# Patient Record
Sex: FEMALE | Race: BLACK OR AFRICAN AMERICAN | NOT HISPANIC OR LATINO
[De-identification: names, ages, dates, MRNs, and addresses within clinical notes are randomized per-mention and may not be internally consistent; named-entity substitution may affect disease eponyms.]

---

## 2018-09-26 ENCOUNTER — LAB SERVICES (OUTPATIENT)
Dept: OTHER | Age: 28
End: 2018-09-26

## 2018-09-26 ENCOUNTER — DIAGNOSTIC TRANS (OUTPATIENT)
Dept: OTHER | Age: 28
End: 2018-09-26

## 2018-09-26 ENCOUNTER — HOSPITAL (OUTPATIENT)
Dept: OTHER | Age: 28
End: 2018-09-26
Attending: EMERGENCY MEDICINE

## 2018-09-26 LAB
AMORPH SED URNS QL MICRO: ABNORMAL
AMORPH SED URNS QL MICRO: ABNORMAL
APPEARANCE UR: CLEAR
APPEARANCE UR: CLEAR
BACTERIA #/AREA URNS HPF: ABNORMAL /HPF
BACTERIA #/AREA URNS HPF: ABNORMAL /HPF
BILIRUB UR QL: NEGATIVE
BILIRUB UR QL: NEGATIVE
CAOX CRY URNS QL MICRO: ABNORMAL
CAOX CRY URNS QL MICRO: ABNORMAL
COLOR UR: ABNORMAL
COLOR UR: ABNORMAL
EPITH CASTS #/AREA URNS LPF: ABNORMAL
EPITH CASTS #/AREA URNS LPF: ABNORMAL /[LPF]
FATTY CASTS #/AREA URNS LPF: ABNORMAL
FATTY CASTS #/AREA URNS LPF: ABNORMAL /[LPF]
GLUCOSE UR-MCNC: NEGATIVE MG/DL
GLUCOSE UR-MCNC: NEGATIVE MG/DL
GRAN CASTS #/AREA URNS LPF: ABNORMAL
GRAN CASTS #/AREA URNS LPF: ABNORMAL /[LPF]
HCG POINT OF CARE (5HGRST): NEGATIVE
HCG POINT OF CARE (5HGRST): NEGATIVE
HGB UR QL: NEGATIVE
HYALINE CASTS #/AREA URNS LPF: ABNORMAL /LPF (ref 0–5)
HYALINE CASTS #/AREA URNS LPF: ABNORMAL /LPF (ref 0–5)
KETONES UR-MCNC: NEGATIVE MG/DL
KETONES UR-MCNC: NEGATIVE MG/DL
LEUKOCYTE ESTERASE UR QL STRIP: NEGATIVE
MIXED CELL CASTS #/AREA URNS LPF: ABNORMAL
MIXED CELL CASTS #/AREA URNS LPF: ABNORMAL /[LPF]
MUCOUS THREADS URNS QL MICRO: PRESENT
MUCOUS THREADS URNS QL MICRO: PRESENT
NITRITE UR QL: NEGATIVE
NITRITE UR QL: NEGATIVE
PH UR: 6 UNIT (ref 5–7)
PH UR: 6 UNITS (ref 5–7)
PROT UR QL: NEGATIVE MG/DL
PROT UR QL: NEGATIVE MG/DL
RBC #/AREA URNS HPF: ABNORMAL /HPF (ref 0–3)
RBC #/AREA URNS HPF: ABNORMAL /HPF (ref 0–3)
RBC CASTS #/AREA URNS LPF: ABNORMAL
RBC CASTS #/AREA URNS LPF: ABNORMAL /[LPF]
RBC-URINE: NEGATIVE
RENAL EPI CELLS #/AREA URNS HPF: ABNORMAL
RENAL EPI CELLS #/AREA URNS HPF: ABNORMAL /[HPF]
SP GR UR: 1.01 (ref 1–1.03)
SP GR UR: 1.01 (ref 1–1.03)
SPECIMEN SOURCE: ABNORMAL
SPECIMEN SOURCE: ABNORMAL
SPERM URNS QL MICRO: ABNORMAL
SPERM URNS QL MICRO: ABNORMAL
SQUAMOUS #/AREA URNS HPF: ABNORMAL /HPF (ref 0–5)
SQUAMOUS #/AREA URNS HPF: ABNORMAL /HPF (ref 0–5)
T VAGINALIS URNS QL MICRO: ABNORMAL
T VAGINALIS URNS QL MICRO: ABNORMAL
TRI-PHOS CRY URNS QL MICRO: ABNORMAL
TRI-PHOS CRY URNS QL MICRO: ABNORMAL
URATE CRY URNS QL MICRO: ABNORMAL
URATE CRY URNS QL MICRO: ABNORMAL
UROBILINOGEN UR QL: 0.2 MG/DL (ref 0–1)
UROBILINOGEN UR QL: 0.2 MG/DL (ref 0–1)
WAXY CASTS #/AREA URNS LPF: ABNORMAL
WAXY CASTS #/AREA URNS LPF: ABNORMAL /[LPF]
WBC #/AREA URNS HPF: ABNORMAL /HPF (ref 0–5)
WBC #/AREA URNS HPF: ABNORMAL /HPF (ref 0–5)
WBC CASTS #/AREA URNS LPF: ABNORMAL
WBC CASTS #/AREA URNS LPF: ABNORMAL /[LPF]
WBC-URINE: NEGATIVE
YEAST HYPHAE URNS QL MICRO: ABNORMAL
YEAST HYPHAE URNS QL MICRO: ABNORMAL
YEAST URNS QL MICRO: ABNORMAL
YEAST URNS QL MICRO: ABNORMAL

## 2018-11-20 ENCOUNTER — HOSPITAL (OUTPATIENT)
Dept: OTHER | Age: 28
End: 2018-11-20
Attending: EMERGENCY MEDICINE

## 2020-10-30 ENCOUNTER — TELEPHONE (OUTPATIENT)
Dept: OBGYN UNIT | Facility: HOSPITAL | Age: 30
End: 2020-10-30

## 2020-11-17 ENCOUNTER — OFFICE VISIT (OUTPATIENT)
Dept: FAMILY MEDICINE CLINIC | Facility: CLINIC | Age: 30
End: 2020-11-17
Payer: COMMERCIAL

## 2020-11-17 VITALS
WEIGHT: 181 LBS | TEMPERATURE: 97 F | DIASTOLIC BLOOD PRESSURE: 81 MMHG | HEART RATE: 99 BPM | SYSTOLIC BLOOD PRESSURE: 120 MMHG | RESPIRATION RATE: 17 BRPM

## 2020-11-17 DIAGNOSIS — J45.40 MODERATE PERSISTENT ASTHMA WITHOUT COMPLICATION: Primary | ICD-10-CM

## 2020-11-17 DIAGNOSIS — E66.9 CLASS 1 OBESITY WITH BODY MASS INDEX (BMI) OF 32.0 TO 32.9 IN ADULT, UNSPECIFIED OBESITY TYPE, UNSPECIFIED WHETHER SERIOUS COMORBIDITY PRESENT: ICD-10-CM

## 2020-11-17 DIAGNOSIS — Z76.89 ESTABLISHING CARE WITH NEW DOCTOR, ENCOUNTER FOR: ICD-10-CM

## 2020-11-17 PROCEDURE — 99072 ADDL SUPL MATRL&STAF TM PHE: CPT | Performed by: FAMILY MEDICINE

## 2020-11-17 PROCEDURE — 3079F DIAST BP 80-89 MM HG: CPT | Performed by: FAMILY MEDICINE

## 2020-11-17 PROCEDURE — 99213 OFFICE O/P EST LOW 20 MIN: CPT | Performed by: FAMILY MEDICINE

## 2020-11-17 PROCEDURE — 3074F SYST BP LT 130 MM HG: CPT | Performed by: FAMILY MEDICINE

## 2020-11-17 RX ORDER — ALBUTEROL SULFATE 2.5 MG/3ML
2.5 SOLUTION RESPIRATORY (INHALATION) EVERY 6 HOURS PRN
COMMUNITY

## 2020-11-17 RX ORDER — PROPRANOLOL HYDROCHLORIDE 10 MG/1
10 TABLET ORAL DAILY
COMMUNITY

## 2020-11-17 RX ORDER — BUDESONIDE AND FORMOTEROL FUMARATE DIHYDRATE 160; 4.5 UG/1; UG/1
AEROSOL RESPIRATORY (INHALATION)
COMMUNITY
Start: 2020-07-20 | End: 2020-11-17

## 2020-11-17 RX ORDER — ALPRAZOLAM 0.25 MG/1
0.25 TABLET ORAL NIGHTLY PRN
COMMUNITY
Start: 2019-03-25

## 2020-11-17 RX ORDER — FLUTICASONE PROPIONATE 50 MCG
SPRAY, SUSPENSION (ML) NASAL
COMMUNITY
Start: 2020-11-01

## 2020-11-17 RX ORDER — ALBUTEROL SULFATE 90 UG/1
AEROSOL, METERED RESPIRATORY (INHALATION)
Qty: 2 INHALER | Refills: 2 | Status: SHIPPED | OUTPATIENT
Start: 2020-11-17 | End: 2020-11-18

## 2020-11-17 RX ORDER — BUDESONIDE AND FORMOTEROL FUMARATE DIHYDRATE 160; 4.5 UG/1; UG/1
2 AEROSOL RESPIRATORY (INHALATION) 2 TIMES DAILY
Qty: 1 INHALER | Refills: 5 | Status: SHIPPED | OUTPATIENT
Start: 2020-11-17

## 2020-11-17 RX ORDER — ALBUTEROL SULFATE 90 UG/1
2 AEROSOL, METERED RESPIRATORY (INHALATION)
COMMUNITY
Start: 2019-02-18 | End: 2020-11-17

## 2020-11-17 RX ORDER — OMEPRAZOLE 20 MG/1
20 CAPSULE, DELAYED RELEASE ORAL DAILY
COMMUNITY

## 2020-11-17 NOTE — PATIENT INSTRUCTIONS
Home nebulizer prescription has been prepared. Refills on Symbicort and also Ventolin rescue inhaler. Medication reviewed and renewed where needed and appropriate. Comply with medications. Monitor blood pressures and record at home. Limit salt intake.

## 2020-11-17 NOTE — PROGRESS NOTES
HPI:    Patient ID: Regina Rani is a 26year old female.     This patient is a 49-year-old -American female here to establish care with new physician and for status update on any confirmed chronic medical illnesses and follow up on any previous l person, place, and time and obese. HENT:   Right Ear: Tympanic membrane and ear canal normal.   Left Ear: Tympanic membrane and ear canal normal.   Nose: Nose normal.   Mouth/Throat: Oropharynx is clear and moist.   Neck: No thyromegaly present.    Cardio

## 2020-11-18 ENCOUNTER — TELEPHONE (OUTPATIENT)
Dept: FAMILY MEDICINE CLINIC | Facility: CLINIC | Age: 30
End: 2020-11-18

## 2020-11-18 DIAGNOSIS — J45.40 MODERATE PERSISTENT ASTHMA WITHOUT COMPLICATION: ICD-10-CM

## 2020-11-18 RX ORDER — ALBUTEROL SULFATE 90 UG/1
2 AEROSOL, METERED RESPIRATORY (INHALATION) EVERY 6 HOURS PRN
Qty: 2 INHALER | Refills: 2 | Status: SHIPPED | OUTPATIENT
Start: 2020-11-18

## 2020-11-18 NOTE — TELEPHONE ENCOUNTER
[id number] calling to get clarification on the directions for the medication below     •  Albuterol Sulfate  (90 Base) MCG/ACT Inhalation Aero Soln, 2 puffs., Disp: 2 Inhaler, Rfl: 2

## 2023-10-03 ENCOUNTER — OFFICE VISIT (OUTPATIENT)
Dept: FAMILY MEDICINE CLINIC | Facility: CLINIC | Age: 33
End: 2023-10-03
Payer: MEDICAID

## 2023-10-03 VITALS
RESPIRATION RATE: 18 BRPM | TEMPERATURE: 97 F | DIASTOLIC BLOOD PRESSURE: 78 MMHG | HEART RATE: 99 BPM | SYSTOLIC BLOOD PRESSURE: 116 MMHG | BODY MASS INDEX: 35.1 KG/M2 | OXYGEN SATURATION: 99 % | HEIGHT: 64.17 IN | WEIGHT: 205.63 LBS

## 2023-10-03 DIAGNOSIS — M79.672 LEFT FOOT PAIN: ICD-10-CM

## 2023-10-03 DIAGNOSIS — Z91.013 ALLERGIC TO SHELLFISH: ICD-10-CM

## 2023-10-03 DIAGNOSIS — Z76.89 ENCOUNTER TO ESTABLISH CARE: Primary | ICD-10-CM

## 2023-10-03 DIAGNOSIS — Z91.018 ALLERGIC TO NUTS (OTHER THAN PEANUTS): ICD-10-CM

## 2023-10-03 DIAGNOSIS — J45.40 MODERATE PERSISTENT ASTHMA WITHOUT COMPLICATION: ICD-10-CM

## 2023-10-03 PROCEDURE — 3078F DIAST BP <80 MM HG: CPT | Performed by: NURSE PRACTITIONER

## 2023-10-03 PROCEDURE — 99203 OFFICE O/P NEW LOW 30 MIN: CPT | Performed by: NURSE PRACTITIONER

## 2023-10-03 PROCEDURE — 3074F SYST BP LT 130 MM HG: CPT | Performed by: NURSE PRACTITIONER

## 2023-10-03 PROCEDURE — 3008F BODY MASS INDEX DOCD: CPT | Performed by: NURSE PRACTITIONER

## 2023-10-03 RX ORDER — FLUTICASONE PROPIONATE AND SALMETEROL 250; 50 UG/1; UG/1
1 POWDER RESPIRATORY (INHALATION) EVERY 12 HOURS SCHEDULED
Qty: 60 EACH | Refills: 3 | Status: SHIPPED | OUTPATIENT
Start: 2023-10-03 | End: 2023-10-09 | Stop reason: DRUGHIGH

## 2023-10-03 RX ORDER — ALBUTEROL SULFATE 90 UG/1
1 AEROSOL, METERED RESPIRATORY (INHALATION) EVERY 4 HOURS PRN
Qty: 6.7 G | Refills: 0 | Status: SHIPPED | OUTPATIENT
Start: 2023-10-03

## 2023-10-03 RX ORDER — EPINEPHRINE 0.3 MG/.3ML
0.3 INJECTION SUBCUTANEOUS ONCE AS NEEDED
Qty: 2 EACH | Refills: 0 | Status: SHIPPED | OUTPATIENT
Start: 2023-10-03 | End: 2023-10-03

## 2023-10-03 RX ORDER — LORATADINE 10 MG/1
10 TABLET ORAL
COMMUNITY

## 2023-10-03 RX ORDER — FLUTICASONE PROPIONATE AND SALMETEROL 250; 50 UG/1; UG/1
1 POWDER RESPIRATORY (INHALATION) EVERY 12 HOURS SCHEDULED
COMMUNITY
End: 2023-10-03

## 2023-10-04 ENCOUNTER — TELEPHONE (OUTPATIENT)
Dept: FAMILY MEDICINE CLINIC | Facility: CLINIC | Age: 33
End: 2023-10-04

## 2023-10-04 NOTE — TELEPHONE ENCOUNTER
PA request for : fluticasone-salmeterol (ADVAIR DISKUS) 250-50 MCG/ACT Inhalation Aerosol Powder, Breath Activated     ePA questions answered and submitted    Awaiting PA response at this time

## 2023-10-05 NOTE — TELEPHONE ENCOUNTER
Denied   10/5/2023 11:02 AM  Case ID: [id number] Appeal supported: No   Note from payer: Details of this decision are provided on the physician outcome notice which has been faxed to the number on file.        Awaiting fax

## 2023-10-09 RX ORDER — MOMETASONE FUROATE AND FORMOTEROL FUMARATE DIHYDRATE 100; 5 UG/1; UG/1
2 AEROSOL RESPIRATORY (INHALATION) 2 TIMES DAILY
Qty: 13 G | Refills: 0 | Status: SHIPPED | OUTPATIENT
Start: 2023-10-09

## 2023-10-09 NOTE — PROGRESS NOTES
Received notice from [facility], fluticasone salmeterol denied.       We will trial Dulera /5 - 2 puffs 2x daily

## 2024-02-26 ENCOUNTER — LAB ENCOUNTER (OUTPATIENT)
Dept: LAB | Age: 34
End: 2024-02-26
Attending: NURSE PRACTITIONER
Payer: MEDICAID

## 2024-02-26 ENCOUNTER — OFFICE VISIT (OUTPATIENT)
Dept: FAMILY MEDICINE CLINIC | Facility: CLINIC | Age: 34
End: 2024-02-26
Payer: MEDICAID

## 2024-02-26 VITALS
TEMPERATURE: 97 F | WEIGHT: 210 LBS | BODY MASS INDEX: 35.85 KG/M2 | DIASTOLIC BLOOD PRESSURE: 80 MMHG | RESPIRATION RATE: 18 BRPM | SYSTOLIC BLOOD PRESSURE: 114 MMHG | HEIGHT: 64.17 IN

## 2024-02-26 DIAGNOSIS — J45.40 MODERATE PERSISTENT ASTHMA WITHOUT COMPLICATION (HCC): ICD-10-CM

## 2024-02-26 DIAGNOSIS — M25.561 CHRONIC PAIN OF BOTH KNEES: ICD-10-CM

## 2024-02-26 DIAGNOSIS — R14.0 ABDOMINAL BLOATING: ICD-10-CM

## 2024-02-26 DIAGNOSIS — Z11.1 SCREENING FOR TUBERCULOSIS: ICD-10-CM

## 2024-02-26 DIAGNOSIS — Z23 ENCOUNTER FOR IMMUNIZATION: ICD-10-CM

## 2024-02-26 DIAGNOSIS — R06.83 SNORING: ICD-10-CM

## 2024-02-26 DIAGNOSIS — R53.83 OTHER FATIGUE: ICD-10-CM

## 2024-02-26 DIAGNOSIS — Z83.3 FAMILY HISTORY OF DIABETES MELLITUS IN BROTHER: ICD-10-CM

## 2024-02-26 DIAGNOSIS — G89.29 CHRONIC PAIN OF BOTH KNEES: ICD-10-CM

## 2024-02-26 DIAGNOSIS — M25.562 CHRONIC PAIN OF BOTH KNEES: ICD-10-CM

## 2024-02-26 DIAGNOSIS — Z00.00 ANNUAL PHYSICAL EXAM: Primary | ICD-10-CM

## 2024-02-26 LAB
ALBUMIN SERPL-MCNC: 3.9 G/DL (ref 3.4–5)
ALBUMIN/GLOB SERPL: 1 {RATIO} (ref 1–2)
ALP LIVER SERPL-CCNC: 102 U/L
ALT SERPL-CCNC: 37 U/L
ANION GAP SERPL CALC-SCNC: 2 MMOL/L (ref 0–18)
AST SERPL-CCNC: 15 U/L (ref 15–37)
BASOPHILS # BLD AUTO: 0.03 X10(3) UL (ref 0–0.2)
BASOPHILS NFR BLD AUTO: 0.6 %
BILIRUB SERPL-MCNC: 0.4 MG/DL (ref 0.1–2)
BUN BLD-MCNC: 12 MG/DL (ref 9–23)
CALCIUM BLD-MCNC: 9.4 MG/DL (ref 8.5–10.1)
CHLORIDE SERPL-SCNC: 107 MMOL/L (ref 98–112)
CHOLEST SERPL-MCNC: 215 MG/DL (ref ?–200)
CO2 SERPL-SCNC: 26 MMOL/L (ref 21–32)
CREAT BLD-MCNC: 0.85 MG/DL
CRP SERPL-MCNC: 0.92 MG/DL (ref ?–0.3)
DEPRECATED HBV CORE AB SER IA-ACNC: 16.6 NG/ML
EGFRCR SERPLBLD CKD-EPI 2021: 92 ML/MIN/1.73M2 (ref 60–?)
EOSINOPHIL # BLD AUTO: 0.14 X10(3) UL (ref 0–0.7)
EOSINOPHIL NFR BLD AUTO: 2.8 %
ERYTHROCYTE [DISTWIDTH] IN BLOOD BY AUTOMATED COUNT: 14 %
EST. AVERAGE GLUCOSE BLD GHB EST-MCNC: 123 MG/DL (ref 68–126)
FASTING PATIENT LIPID ANSWER: YES
FASTING STATUS PATIENT QL REPORTED: YES
GLOBULIN PLAS-MCNC: 3.8 G/DL (ref 2.8–4.4)
GLUCOSE BLD-MCNC: 82 MG/DL (ref 70–99)
HBA1C MFR BLD: 5.9 % (ref ?–5.7)
HCT VFR BLD AUTO: 38.5 %
HDLC SERPL-MCNC: 58 MG/DL (ref 40–59)
HGB BLD-MCNC: 11.9 G/DL
IMM GRANULOCYTES # BLD AUTO: 0.01 X10(3) UL (ref 0–1)
IMM GRANULOCYTES NFR BLD: 0.2 %
IRON SATN MFR SERPL: 11 %
IRON SERPL-MCNC: 51 UG/DL
LDLC SERPL CALC-MCNC: 141 MG/DL (ref ?–100)
LYMPHOCYTES # BLD AUTO: 2.29 X10(3) UL (ref 1–4)
LYMPHOCYTES NFR BLD AUTO: 45.1 %
MCH RBC QN AUTO: 24.9 PG (ref 26–34)
MCHC RBC AUTO-ENTMCNC: 30.9 G/DL (ref 31–37)
MCV RBC AUTO: 80.7 FL
MONOCYTES # BLD AUTO: 0.43 X10(3) UL (ref 0.1–1)
MONOCYTES NFR BLD AUTO: 8.5 %
NEUTROPHILS # BLD AUTO: 2.18 X10 (3) UL (ref 1.5–7.7)
NEUTROPHILS # BLD AUTO: 2.18 X10(3) UL (ref 1.5–7.7)
NEUTROPHILS NFR BLD AUTO: 42.8 %
NONHDLC SERPL-MCNC: 157 MG/DL (ref ?–130)
OSMOLALITY SERPL CALC.SUM OF ELEC: 279 MOSM/KG (ref 275–295)
PLATELET # BLD AUTO: 346 10(3)UL (ref 150–450)
POTASSIUM SERPL-SCNC: 4.2 MMOL/L (ref 3.5–5.1)
PROT SERPL-MCNC: 7.7 G/DL (ref 6.4–8.2)
RBC # BLD AUTO: 4.77 X10(6)UL
SODIUM SERPL-SCNC: 135 MMOL/L (ref 136–145)
THYROPEROXIDASE AB SERPL-ACNC: <28 U/ML (ref ?–60)
TIBC SERPL-MCNC: 474 UG/DL (ref 240–450)
TRANSFERRIN SERPL-MCNC: 318 MG/DL (ref 200–360)
TRIGL SERPL-MCNC: 88 MG/DL (ref 30–149)
TSI SER-ACNC: 1.81 MIU/ML (ref 0.36–3.74)
VIT B12 SERPL-MCNC: 397 PG/ML (ref 193–986)
VIT D+METAB SERPL-MCNC: 18.8 NG/ML (ref 30–100)
VLDLC SERPL CALC-MCNC: 16 MG/DL (ref 0–30)
WBC # BLD AUTO: 5.1 X10(3) UL (ref 4–11)

## 2024-02-26 PROCEDURE — 86480 TB TEST CELL IMMUN MEASURE: CPT | Performed by: NURSE PRACTITIONER

## 2024-02-26 PROCEDURE — 86364 TISS TRNSGLTMNASE EA IG CLAS: CPT | Performed by: NURSE PRACTITIONER

## 2024-02-26 PROCEDURE — 86258 DGP ANTIBODY EACH IG CLASS: CPT | Performed by: NURSE PRACTITIONER

## 2024-02-26 PROCEDURE — 86140 C-REACTIVE PROTEIN: CPT | Performed by: NURSE PRACTITIONER

## 2024-02-26 PROCEDURE — 82306 VITAMIN D 25 HYDROXY: CPT | Performed by: NURSE PRACTITIONER

## 2024-02-26 PROCEDURE — 84443 ASSAY THYROID STIM HORMONE: CPT | Performed by: NURSE PRACTITIONER

## 2024-02-26 PROCEDURE — 86003 ALLG SPEC IGE CRUDE XTRC EA: CPT | Performed by: NURSE PRACTITIONER

## 2024-02-26 PROCEDURE — 90715 TDAP VACCINE 7 YRS/> IM: CPT | Performed by: NURSE PRACTITIONER

## 2024-02-26 PROCEDURE — 80061 LIPID PANEL: CPT | Performed by: NURSE PRACTITIONER

## 2024-02-26 PROCEDURE — 86376 MICROSOMAL ANTIBODY EACH: CPT | Performed by: NURSE PRACTITIONER

## 2024-02-26 PROCEDURE — 85025 COMPLETE CBC W/AUTO DIFF WBC: CPT | Performed by: NURSE PRACTITIONER

## 2024-02-26 PROCEDURE — 82728 ASSAY OF FERRITIN: CPT | Performed by: NURSE PRACTITIONER

## 2024-02-26 PROCEDURE — 82785 ASSAY OF IGE: CPT | Performed by: NURSE PRACTITIONER

## 2024-02-26 PROCEDURE — 82607 VITAMIN B-12: CPT | Performed by: NURSE PRACTITIONER

## 2024-02-26 PROCEDURE — 80053 COMPREHEN METABOLIC PANEL: CPT | Performed by: NURSE PRACTITIONER

## 2024-02-26 PROCEDURE — 36415 COLL VENOUS BLD VENIPUNCTURE: CPT | Performed by: NURSE PRACTITIONER

## 2024-02-26 PROCEDURE — 86225 DNA ANTIBODY NATIVE: CPT | Performed by: NURSE PRACTITIONER

## 2024-02-26 PROCEDURE — 83550 IRON BINDING TEST: CPT | Performed by: NURSE PRACTITIONER

## 2024-02-26 PROCEDURE — 83036 HEMOGLOBIN GLYCOSYLATED A1C: CPT | Performed by: NURSE PRACTITIONER

## 2024-02-26 PROCEDURE — 99395 PREV VISIT EST AGE 18-39: CPT | Performed by: NURSE PRACTITIONER

## 2024-02-26 PROCEDURE — 90471 IMMUNIZATION ADMIN: CPT | Performed by: NURSE PRACTITIONER

## 2024-02-26 PROCEDURE — 83540 ASSAY OF IRON: CPT | Performed by: NURSE PRACTITIONER

## 2024-02-26 PROCEDURE — 86038 ANTINUCLEAR ANTIBODIES: CPT | Performed by: NURSE PRACTITIONER

## 2024-02-26 RX ORDER — FLUTICASONE PROPIONATE AND SALMETEROL 250; 50 UG/1; UG/1
1 POWDER RESPIRATORY (INHALATION) EVERY 12 HOURS SCHEDULED
Qty: 60 EACH | Refills: 3 | Status: SHIPPED | OUTPATIENT
Start: 2024-02-26

## 2024-02-26 NOTE — PROGRESS NOTES
CHIEF COMPLAINT:    Chief Complaint   Patient presents with    Physical     Patient here for annual physical     Weight Problem     Weight concerns        HISTORY OF PRESENT ILLNESS:    Haider Sanders is a 33 year old female who presents today, February 26, 2024, for an adult physical and concerns regarding current weight.    NUTRITION:  Follows a regular diet.  Lactose intolerant.  Drinks approximately 64oz of water a day.  SLEEP:  Sleeps throughout the night.  Waking up feeling tired.  Snoring intermittently.  SAFETY:  Reports feeling safe at home.  Wears seatbelt  PHYSICAL ACTIVITY/SOCIAL/HOBBIES:  Working two jobs, has a supportive boyfriend. Working as GI MA and Agency CNA.    GOAL:  Haider's goal weight is 160lb.  Haider would also like various labs as mentioned below.  Reports dairy foods increase abdominal bloating, fhx of heart disease in father and oldest brother is a diabetic who was diagnosed at the age of 40.  Paternal aunt with hx of RA.    SHAVON - RA paternal aunt, aching knees and lower back pain  TPO - fatigue, discussed necessity, Haider would like to proceed with testing  Vit D - denies use of vitamin d supplement, always tired, does not feel rested after waking up, agreeable to sleep study  Vit B - denies use of supplements, would like to know baseline vitamin b level  CRP - discussed that this test checks for inflammation  A1C - fhx of diabetes in oldest brother, diagnosed at age 41yo  Iron - fatigue  Allergy test - GI upset, abdominal bloating    Quant - states she needs this for her job, requests screening for TB via blood work - works as medical assistant and CNA    IMMUNIZATIONS:  Agreeable to receive tdap today.    Patient Active Problem List   Diagnosis    Moderate persistent asthma without complication (HCC)      Asthma, well controlled.  Denies use of albuterol inhaler in the past year.    ALLERGIES:  Allergies   Allergen Reactions    Dander HIVES    Molds & Smuts HIVES    Nyquil Severe  Cold-Flu  [Peg-[place] Pls Night] SWELLING    Other OTHER (SEE COMMENTS)     SNEEZING, WATERY EYES, HIVES    Shellfish-Derived Products ANAPHYLAXIS    Dust Mites ITCHING    Latex ITCHING    Seasonal OTHER (SEE COMMENTS)     Seasonal.    Shrimp OTHER (SEE COMMENTS)     Itchy throat    Walnuts OTHER (SEE COMMENTS)     Upset stomach       CURRENT MEDICATIONS:  Current Outpatient Medications   Medication Sig Dispense Refill    fluticasone-salmeterol (ADVAIR DISKUS) 250-50 MCG/ACT Inhalation Aerosol Powder, Breath Activated Inhale 1 puff into the lungs every 12 (twelve) hours. 60 each 3    loratadine 10 MG Oral Tab Take 1 tablet (10 mg total) by mouth daily as needed for Allergies.      NASAL SPRAY SALINE NA by Nasal route. OTC      albuterol (VENTOLIN HFA) 108 (90 Base) MCG/ACT Inhalation Aero Soln Inhale 1 puff into the lungs every 4 (four) hours as needed for Wheezing or Shortness of Breath. 6.7 g 0       MEDICAL HISTORY:  Past Medical History:   Diagnosis Date    Allergic rhinitis     Allergies     Asthma (HCC)     Esophageal reflux      Past Surgical History:   Procedure Laterality Date           Family History   Problem Relation Age of Onset    Pulmonary Disease Father     Hypertension Mother     Asthma Mother     Heart Disorder Maternal Grandmother     Dementia Maternal Grandmother     Dementia Paternal Grandmother     Diabetes Brother     Diabetes Brother      Family Status   Relation Status    Fa     Mo Alive    MGMA     PGMA     Bro Alive    Bro (Not Specified)     Social History     Socioeconomic History    Marital status: Single   Tobacco Use    Smoking status: Never    Smokeless tobacco: Never   Vaping Use    Vaping Use: Never used   Substance and Sexual Activity    Alcohol use: Not Currently    Drug use: Never   Other Topics Concern    Caffeine Concern No    Exercise No    Seat Belt No    Special Diet No    Stress Concern No    Weight Concern Yes     Comment: Over weight        ROS:    GENERAL:  Fatigue.  Denies fever or chills  RESPIRATORY:  Denies difficulty breathing  CARDIAC:  Denies chest pain with exertion  GI:  Denies nausea, vomiting, diarrhea, constipation, or blood in stool  :  Denies blood in urine or painful urination  REPRO:  Denies vaginal discharge or pelvic pain  NEURO:  Denies recent falls   MSKL:  Bilateral knee pain, aching knees and lower back pain  SKIN:  Denies change in texture of moles   PSYCH: Denies thoughts of self harm or harming others    VITALS:    /80 (BP Location: Left arm, Patient Position: Sitting)   Temp 97.4 °F (36.3 °C)   Resp 18   Ht 5' 4.17\" (1.63 m)   Wt 210 lb (95.3 kg)   LMP 02/08/2024 (Approximate)   BMI 35.86 kg/m²     Ideal body weight: 55.1 kg (121 lb 7.3 oz)  Adjusted ideal body weight: 71.2 kg (156 lb 13.9 oz)  Wt Readings from Last 3 Encounters:   02/26/24 210 lb (95.3 kg)   10/03/23 205 lb 9.6 oz (93.3 kg)   11/17/20 181 lb (82.1 kg)     BP Readings from Last 3 Encounters:   02/26/24 114/80   10/03/23 116/78   11/17/20 120/81     Reviewed by Bianca Jimenez MS, APRN, FNP-BC    PHYSICAL EXAM:    Constitutional:       Appearance: Normal appearance.  Sitting upright on exam table.  Well developed, well nourished, and in no acute distress.  HENT:      Head: Facial features symmetric. Normocephalic and atraumatic.      Right Ear: Canal clear without erythema or drainage.  TM clear and intact, neutral in position.      Left Ear: Canal clear without erythema or drainage.  TM clear and intact, neutral in position.      Nose: Nose normal.      Mouth/Throat: Mucous membranes are moist.  Uvula rises midline.  Eyes:      Extraocular Movements: Extraocular movements intact.      Conjunctiva/sclera: Conjunctivae normal. Sclera anicteric         Pupils: Pupils are equal, round, and reactive to light.   Neck:     Neck is supple. Trachea is midline.  No masses.   Cardiovascular:      Heart sounds: Regular rate and rhythm without  murmur.     No edema BLE.  Pulmonary:      Effort: Pulmonary effort is normal.      Breath sounds: Lungs clear throughout.     No cough or wheezing.  Abdominal:      General: Abdomen is nondistended, bowel sounds normoactive, soft, nontender.  No organomegaly.  Musculoskeletal:         General: Normal range of motion. Strength of extremities are equal bilaterally.     Range of motion without limitations.  Skin:     General: Skin is warm and dry.      Coloration: Skin is without jaundice     Findings: No bruising or rashes  Neurological:      General: No focal deficit present. Speech is clear and organized.     Mental Status: Alert and oriented to person, place, and time.      Sensory: Sensation is intact.      Motor: Motor function is intact. Movements are smooth and controlled without ataxia.     Coordination: Coordination is intact. Coordination normal.      Gait: Gait steady and nonantalgic.   Psychiatric:         Mood and Affect: Mood normal.         Behavior: Behavior normal.         Thought Content: Thought content normal.         Judgment: Judgment normal.     ASSESSMENT & PLAN:  Plan on follow-up in 1 year or earlier if needed  Refer to result notes for further information    1. Annual physical exam  - Comp Metabolic Panel (14); Future  - CBC With Differential With Platelet; Future  - TSH W Reflex To Free T4; Future  - Lipid Panel; Future  - VENIPUNCTURE  - Hemoglobin A1C [E]; Future  - Quantiferon TB Plus; Future  - Iron And Tibc [E]; Future  - Ferritin [E]; Future  - TdaP (Adacel, Boostrix) [94189]  - Thyroid Peroxidase (TPO) AB [E]; Future  - Vitamin D [E]; Future  - Vitamin B12 [E]; Future  - Comp Metabolic Panel (14)  - CBC With Differential With Platelet  - TSH W Reflex To Free T4  - Lipid Panel  - Hemoglobin A1C [E]  - Quantiferon TB Plus  - Iron And Tibc [E]  - Ferritin [E]  - Thyroid Peroxidase (TPO) AB [E]  - Vitamin D [E]  - Vitamin B12 [E]    2. Other fatigue  - CBC With Differential With  Platelet; Future  - TSH W Reflex To Free T4; Future  - Iron And Tibc [E]; Future  - Ferritin [E]; Future  - Thyroid Peroxidase (TPO) AB [E]; Future  - Connective Tissue Disease (SHAVON) Screen [E]; Future  - C-Reactive Protein [E]; Future  - Vitamin D [E]; Future  - Vitamin B12 [E]; Future  - Home Sleep Apnea Test (Adult pt only) - Sleep consult required for Medicare pts  - General sleep study; Future  - CBC With Differential With Platelet  - TSH W Reflex To Free T4  - Iron And Tibc [E]  - Ferritin [E]  - Thyroid Peroxidase (TPO) AB [E]  - Connective Tissue Disease (SHAVON) Screen [E]  - C-Reactive Protein [E]  - Vitamin D [E]  - Vitamin B12 [E]    3. Family history of diabetes mellitus in brother  - Comp Metabolic Panel (14); Future  - Hemoglobin A1C [E]; Future  - Connective Tissue Disease (SHAVON) Screen [E]; Future  - C-Reactive Protein [E]; Future  - Comp Metabolic Panel (14)  - Hemoglobin A1C [E]  - Connective Tissue Disease (SHAVON) Screen [E]  - C-Reactive Protein [E]    4. Snoring  - Home Sleep Apnea Test (Adult pt only) - Sleep consult required for Medicare pts  - General sleep study; Future    5. Chronic pain of both knees  - Connective Tissue Disease (SHAVON) Screen [E]; Future  - C-Reactive Protein [E]; Future  - Connective Tissue Disease (SHAVON) Screen [E]  - C-Reactive Protein [E]    6. Abdominal bloating  - Allergen recurrent GI distress; Future  - Allergen recurrent GI distress    7. Screening for tuberculosis  - Quantiferon TB Plus; Future  - Quantiferon TB Plus    8. Encounter for immunization  - TdaP (Adacel, Boostrix) [67157]    9. Moderate persistent asthma without complication (HCC)  Controlled  - fluticasone-salmeterol (ADVAIR DISKUS) 250-50 MCG/ACT Inhalation Aerosol Powder, Breath Activated; Inhale 1 puff into the lungs every 12 (twelve) hours.  Dispense: 60 each; Refill: 3

## 2024-02-28 LAB
DSDNA IGG SERPL IA-ACNC: 1.1 IU/ML
ENA AB SER QL IA: 0.2 UG/L
ENA AB SER QL IA: NEGATIVE
M TB IFN-G CD4+ T-CELLS BLD-ACNC: 0.01 IU/ML
M TB TUBERC IFN-G BLD QL: NEGATIVE
M TB TUBERC IGNF/MITOGEN IGNF CONTROL: >10 IU/ML
QFT TB1 AG MINUS NIL: -0.01 IU/ML
QFT TB2 AG MINUS NIL: -0.01 IU/ML

## 2024-02-29 ENCOUNTER — TELEPHONE (OUTPATIENT)
Dept: FAMILY MEDICINE CLINIC | Facility: CLINIC | Age: 34
End: 2024-02-29

## 2024-02-29 ENCOUNTER — PATIENT MESSAGE (OUTPATIENT)
Dept: FAMILY MEDICINE CLINIC | Facility: CLINIC | Age: 34
End: 2024-02-29

## 2024-02-29 LAB
CODFISH IGE QN: <0.1 KUA/L (ref ?–0.1)
COW MILK IGE QN: 0.48 KUA/L (ref ?–0.1)
GLIADIN IGA SER-ACNC: 0.7 U/ML (ref ?–7)
GLIADIN IGG SER-ACNC: <0.6 U/ML (ref ?–7)
GLUTEN IGE QN: 0.18 KUA/L (ref ?–0.1)
HAZELNUT IGE QN: 0.11 KUA/L (ref ?–0.1)
IGE SERPL-ACNC: 628 KU/L (ref 2–214)
PEANUT IGE QN: 0.21 KUA/L (ref ?–0.1)
SCALLOP IGE QN: <0.1 KUA/L (ref ?–0.1)
SESAME SEED IGE QN: 1 KUA/L (ref ?–0.1)
SHRIMP IGE QN: <0.1 KUA/L (ref ?–0.1)
SOYBEAN IGE QN: <0.1 KUA/L (ref ?–0.1)
TTG IGA SER-ACNC: 0.3 U/ML (ref ?–7)
TTG IGG SER-ACNC: 0.7 U/ML (ref ?–7)
WALNUT IGE QN: 0.15 KUA/L (ref ?–0.1)
WHEAT IGE QN: 0.28 KUA/L (ref ?–0.1)

## 2024-02-29 NOTE — TELEPHONE ENCOUNTER
----- Message from PELON Burris sent at 2/28/2024 12:50 PM CST -----  VIT D - low, take Vitamin D supplement as advised.  Eat a diet rich in vitamin d.  Some foods to consider include salmon, fortified cereals (Shinto's Oats, [place]'s Special K and Multi Grain Cheerios), eggs, and mushrooms.  Low vitamin d levels may cause fatigue, interfere with how your body absorbs nutrients, muscle aches, increases risk of fractures, has been linked to prediabetes, and may increase risk of hypertension.    A1C - within prediabetic range - consider plate method where 1/2 plate is veggies and fiber rich fruits, 1/4 whole grain/carb, 1/4 lean protein - return to clinic in 6-12 months for follow-up.    CMP - blood sugar, electrolytes, kidney function, liver enzymes and protein levels are stable    LIPID PANEL - LDL is elevated, this is considered \"bad\" cholesterol.  Improve diet by limiting red meat.  The heart foundation suggests consuming less than 350g (12oz or about 0.75lb) of red meat per week.  Also reduce intake of fried foods, red meat, deli meat, pastries, chips, butter, and ice cream.  Increase aerobic exercise, the goal is 10,000 steps a day or 2.5hours a week.    IRON STUDIES - iron is borderline low, cells have more room to carry iron, recommending to begin OTC iron supplement 65mg of elemental iron daily for at least 3 months - RTC in 3-6 months    CBC - hgb slightly low, stable, mch low meaning cells are a lighter red than normal, these values should improve with iron supplement    TSH - thyroid stimulating hormone level is stable  TB screening - negative  SHAVON - negative screening for autoimmune connective tissue disorder  TPO - negative  VIT B12 - within normal limits

## 2024-02-29 NOTE — TELEPHONE ENCOUNTER
From: Haider Sanders  To: Bianca Jimenez  Sent: 2/29/2024 7:50 AM CST  Subject: Allergy results     Good morning Bianca I hope all is well . I just received my allergy test I wanted to know is it a go to eat shell fish (shrimp ) etc or should I continue to avoid that , I was also very concerned about Immunoglobulin E it’s extremely high can you give me some insight on what this actually means .   Thanks

## 2024-02-29 NOTE — TELEPHONE ENCOUNTER
IgE confirms allergic process is present  Low to mild allergens identified    Due to previous reactions to shrimp, such as itchy throat, I would not recommend eating this unless an allergist specialist informs you otherwise (see lab result notes)

## 2024-03-04 NOTE — TELEPHONE ENCOUNTER
Yes, lets schedule an appointment to discuss treatment options for weight loss management    Shanika Dunn RN  3/1/2024 11:08 AM CST Back to Top      Written by PELON Burris on 2/28/2024 12:50 PM CST  Seen by patient Haider Sanders on 2/28/2024  1:08 PM    PELON Burris  2/29/2024  4:38 PM CST       IgE confirms allergic process is present  Low to mild allergens identified     Due to previous reactions to shrimp, such as itchy throat, I would not recommend eating this unless an allergist specialist informs you otherwise.       Would she like to discuss results further with allergist?

## 2024-03-04 NOTE — TELEPHONE ENCOUNTER
Pt stating she has not received a response from her [employer] messages regarding allergy tests or prescriptions.  Please advise.  Thank you!

## 2024-03-06 ENCOUNTER — TELEPHONE (OUTPATIENT)
Dept: FAMILY MEDICINE CLINIC | Facility: CLINIC | Age: 34
End: 2024-03-06

## 2024-03-06 NOTE — TELEPHONE ENCOUNTER
Received paperwork Futicasone- Salmeterol for a prior authorization     Requested Prior Authorization   Answered questions

## 2024-03-08 ENCOUNTER — TELEMEDICINE (OUTPATIENT)
Dept: FAMILY MEDICINE CLINIC | Facility: CLINIC | Age: 34
End: 2024-03-08
Payer: MEDICAID

## 2024-03-08 DIAGNOSIS — E66.9 CLASS 2 OBESITY WITHOUT SERIOUS COMORBIDITY WITH BODY MASS INDEX (BMI) OF 35.0 TO 35.9 IN ADULT, UNSPECIFIED OBESITY TYPE: ICD-10-CM

## 2024-03-08 DIAGNOSIS — Z71.3 ENCOUNTER FOR WEIGHT LOSS COUNSELING: Primary | ICD-10-CM

## 2024-03-08 DIAGNOSIS — R73.03 PREDIABETES: ICD-10-CM

## 2024-03-08 PROBLEM — E66.812 CLASS 2 OBESITY WITHOUT SERIOUS COMORBIDITY WITH BODY MASS INDEX (BMI) OF 35.0 TO 35.9 IN ADULT: Status: ACTIVE | Noted: 2024-03-08

## 2024-03-08 PROCEDURE — 99213 OFFICE O/P EST LOW 20 MIN: CPT | Performed by: NURSE PRACTITIONER

## 2024-03-08 NOTE — TELEPHONE ENCOUNTER
Called [facility], spoke to Dulce Maria, she states generic needed a PA. I asked if she could try running brand, she states message came up that refill was too soon. This would be available on 3/14/2024.     Left detailed message on pt's voicemail with above details. If she had any questions she is to call our office.

## 2024-03-08 NOTE — PROGRESS NOTES
VIDEO VISIT    CHIEF COMPLAINT:  No chief complaint on file.      HISTORY OF PRESENT ILLNESS:  This is a 33 year old female who verbally consents to a video visit today, March 08, 2024 to discuss weight loss options.    Haider is a prediabetic female with a history of tachycardia, reports completing stress test in the past, normal results per patient.  Family history of diabetes in brother.  Expresses concern as she would like to avoid becoming a diabetic.  Is interested in restarting semaglutide injections as she has tolerated these in the past with her previous pcp who was out of [place], [place].    Has implemented lifestyle changes:  intermittent fasting, not eating past 8pm, making healthier food choices, and is no longer eating out.    Not tracking calories and is agreeable to begin doing so.      Denies adverse events with semaglutide.  Denies anxiety or depression.    Patient recording of 207lb this morning    Wt Readings from Last 5 Encounters:   02/26/24 210 lb (95.3 kg)   10/03/23 205 lb 9.6 oz (93.3 kg)   11/17/20 181 lb (82.1 kg)     BMI Readings from Last 5 Encounters:   02/26/24 35.86 kg/m²   10/03/23 35.10 kg/m²       ALLERGIES:  Allergies   Allergen Reactions    Dander HIVES    Molds & Smuts HIVES    Nyquil Severe Cold-Flu  [Peg-[place] Pls Night] SWELLING    Other OTHER (SEE COMMENTS)     SNEEZING, WATERY EYES, HIVES    Shellfish-Derived Products ANAPHYLAXIS    Dust Mites ITCHING    Latex ITCHING    Seasonal OTHER (SEE COMMENTS)     Seasonal.    Shrimp OTHER (SEE COMMENTS)     Itchy throat    Walnuts OTHER (SEE COMMENTS)     Upset stomach       CURRENT MEDICATIONS:  Current Outpatient Medications   Medication Sig Dispense Refill    ergocalciferol 1.25 MG (57183 UT) Oral Cap Take 1 capsule (50,000 Units total) by mouth once a week for 8 doses. Then on week 9, begin taking OTC vitamin d3 800-1000iu PO daily 8 capsule 0    fluticasone-salmeterol (ADVAIR DISKUS) 250-50 MCG/ACT Inhalation Aerosol  Powder, Breath Activated Inhale 1 puff into the lungs every 12 (twelve) hours. 60 each 3    loratadine 10 MG Oral Tab Take 1 tablet (10 mg total) by mouth daily as needed for Allergies.      NASAL SPRAY SALINE NA by Nasal route. OTC      albuterol (VENTOLIN HFA) 108 (90 Base) MCG/ACT Inhalation Aero Soln Inhale 1 puff into the lungs every 4 (four) hours as needed for Wheezing or Shortness of Breath. 6.7 g 0       MEDICAL HISTORY:  Past Medical History:   Diagnosis Date    Allergic rhinitis     Allergies     Asthma (HCC)     Esophageal reflux      Past Surgical History:   Procedure Laterality Date           Family History   Problem Relation Age of Onset    Pulmonary Disease Father     Hypertension Mother     Asthma Mother     Heart Disorder Maternal Grandmother     Dementia Maternal Grandmother     Dementia Paternal Grandmother     Diabetes Brother     Diabetes Brother      Family Status   Relation Status    Fa     Mo Alive    MGMA     PGMA     Bro Alive    Bro (Not Specified)     Social History     Socioeconomic History    Marital status: Single   Tobacco Use    Smoking status: Never    Smokeless tobacco: Never   Vaping Use    Vaping Use: Never used   Substance and Sexual Activity    Alcohol use: Not Currently    Drug use: Never   Other Topics Concern    Caffeine Concern No    Exercise No    Seat Belt No    Special Diet No    Stress Concern No    Weight Concern Yes     Comment: Over weight       ROS:  GENERAL:  Denies fever or chills  RESPIRATORY:  Denies difficulty breathing  CARDIO:  Denies swelling of legs or chest pain with exertion  NEURO:  Denies recent falls or sudden changes of vision  PSYCH: Denies suicidal or homicidal ideations    VITALS:  No vitals were obtained by clinical staff during this visit.      PHYSICAL EXAM:    Physical exam is limited due to video visit.    Haider Sanders serves as a reliable historian.  Speech is clear and organized without difficulty speaking in  full sentences.    No shortness of breath or cough noted during our conversation.  Overall is feeling well.    ASSESSMENT & PLAN:    Haider would like to restart semaglutide injections for weight loss  Prediabetic, if semaglutide is not approved, considering metformin and/or referral to our weight loss clinic    1. Encounter for weight loss counseling  - semaglutide-weight management 0.25 MG/0.5ML Subcutaneous Solution Auto-injector; Inject 0.5 mL (0.25 mg total) into the skin once a week for 4 doses.  Dispense: 2 mL; Refill: 0    2. Prediabetes  - semaglutide-weight management 0.25 MG/0.5ML Subcutaneous Solution Auto-injector; Inject 0.5 mL (0.25 mg total) into the skin once a week for 4 doses.  Dispense: 2 mL; Refill: 0    3. Class 2 obesity without serious comorbidity with body mass index (BMI) of 35.0 to 35.9 in adult, unspecified obesity type  - semaglutide-weight management 0.25 MG/0.5ML Subcutaneous Solution Auto-injector; Inject 0.5 mL (0.25 mg total) into the skin once a week for 4 doses.  Dispense: 2 mL; Refill: 0

## 2024-03-11 ENCOUNTER — PATIENT MESSAGE (OUTPATIENT)
Dept: FAMILY MEDICINE CLINIC | Facility: CLINIC | Age: 34
End: 2024-03-11

## 2024-03-11 DIAGNOSIS — Z71.3 ENCOUNTER FOR WEIGHT LOSS COUNSELING: Primary | ICD-10-CM

## 2024-03-11 NOTE — TELEPHONE ENCOUNTER
From: Haider Sanders  To: Bianca Jimenez  Sent: 3/11/2024 10:00 AM CDT  Subject: Wegovy     Hi, so I spoke with my pharmacy and [facility] did not approve it even though the diagnosis codes where on there , moving forward what are my options ?

## 2024-03-13 DIAGNOSIS — J45.40 MODERATE PERSISTENT ASTHMA WITHOUT COMPLICATION (HCC): ICD-10-CM

## 2024-03-13 RX ORDER — MOMETASONE FUROATE AND FORMOTEROL FUMARATE DIHYDRATE 100; 5 UG/1; UG/1
2 AEROSOL RESPIRATORY (INHALATION) 2 TIMES DAILY
Qty: 39 G | Refills: 1 | Status: SHIPPED | OUTPATIENT
Start: 2024-03-13 | End: 2024-03-16

## 2024-03-13 NOTE — PROGRESS NOTES
Fluticasone-salmeterol needing prior authorization  Previously prescribed dulera in the past to replace advair    Stopping advair and replacing with dulera as previously prescribed

## 2024-03-16 DIAGNOSIS — J45.40 MODERATE PERSISTENT ASTHMA WITHOUT COMPLICATION (HCC): ICD-10-CM

## 2024-03-16 RX ORDER — FLUTICASONE PROPIONATE AND SALMETEROL 250; 50 UG/1; UG/1
1 POWDER RESPIRATORY (INHALATION) EVERY 12 HOURS SCHEDULED
Qty: 60 EACH | Refills: 3 | OUTPATIENT
Start: 2024-03-16

## 2024-03-16 RX ORDER — FLUTICASONE PROPIONATE AND SALMETEROL 50; 250 UG/1; UG/1
1 POWDER RESPIRATORY (INHALATION) EVERY 12 HOURS
Qty: 60 EACH | Refills: 3 | Status: SHIPPED | OUTPATIENT
Start: 2024-03-16

## 2024-03-16 NOTE — TELEPHONE ENCOUNTER
Reviewed pt's medications -   Rx Advair sent 02/26/24 - PA DENIED  Rx Dulera sent 03/13/24    Called and spoke with pt - advised pt of note above -she v/u  Pt reports she does not want to try a different inhaler, pt states she has been using advair and knows this works for her    Pt states she received letter from insurance stating PA for Advair was approved - advised pt can send Rx advair to pharmacy and pt can bring letter to pharmacy - she v/u  Advised pt to send photo of insurance letter to our office as well for our records - she v/u and stated will send [facility]    Asthma & COPD Medication Protocol [id number]/16/2024 09:49 AM   Protocol Details Asthma Action Score greater than or equal to 20    Appointment in past 6 or next 3 months    AAP/ACT given in last 12 months   Order per protocol

## 2024-03-16 NOTE — TELEPHONE ENCOUNTER
JONELLE pt - Pt states that pharmacy is saying that she has no refills of medication.  Pt states she needs medication and [facility] said she refilled it.  PT states she is out of medication and needs med. Pt also states she received a call from [facility] saying that she needed prior authorization and then meds would be sent. Please advise.  Thank you!    fluticasone-salmeterol (ADVAIR DISKUS) 250-50 MCG/ACT Inhalation Aerosol Powder, Breath Activated [[id number]] (Order [id number]       [id number] - [place], [place] - [address] [phone], [phone]   [facility] [address] [facility] [address]   Phone: [phone] Fax: [phone]

## 2024-03-26 ENCOUNTER — OFFICE VISIT (OUTPATIENT)
Dept: FAMILY MEDICINE CLINIC | Facility: CLINIC | Age: 34
End: 2024-03-26
Payer: MEDICAID

## 2024-03-26 VITALS
SYSTOLIC BLOOD PRESSURE: 134 MMHG | BODY MASS INDEX: 34.82 KG/M2 | RESPIRATION RATE: 16 BRPM | HEIGHT: 65 IN | OXYGEN SATURATION: 96 % | TEMPERATURE: 98 F | WEIGHT: 209 LBS | HEART RATE: 94 BPM | DIASTOLIC BLOOD PRESSURE: 88 MMHG

## 2024-03-26 DIAGNOSIS — Z12.4 SCREENING FOR CERVICAL CANCER: Primary | ICD-10-CM

## 2024-03-26 DIAGNOSIS — Z11.3 SCREENING EXAMINATION FOR STI: ICD-10-CM

## 2024-03-26 PROCEDURE — 87591 N.GONORRHOEAE DNA AMP PROB: CPT | Performed by: NURSE PRACTITIONER

## 2024-03-26 PROCEDURE — 88175 CYTOPATH C/V AUTO FLUID REDO: CPT | Performed by: NURSE PRACTITIONER

## 2024-03-26 PROCEDURE — 87624 HPV HI-RISK TYP POOLED RSLT: CPT | Performed by: NURSE PRACTITIONER

## 2024-03-26 PROCEDURE — 87491 CHLMYD TRACH DNA AMP PROBE: CPT | Performed by: NURSE PRACTITIONER

## 2024-03-26 NOTE — PROGRESS NOTES
CHIEF COMPLAINT:    Chief Complaint   Patient presents with    Gyn Exam     Pap only       HISTORY OF PRESENT ILLNESS:    Haider presents today, 2024, for pap only.  Hx of abnormal pap in 2016.  Followed with cryotherapy.  Since then has had normal results per patient.  Denies vaginal discharge or any further concerns.    ALLERGIES:  Allergies   Allergen Reactions    Dander HIVES    Molds & Smuts HIVES    Nyquil Severe Cold-Flu  [Peg-Semmes Pls Night] SWELLING    Other OTHER (SEE COMMENTS)     SNEEZING, WATERY EYES, HIVES    Shellfish-Derived Products ANAPHYLAXIS    Dust Mites ITCHING    Latex ITCHING    Seasonal OTHER (SEE COMMENTS)     Seasonal.    Shrimp OTHER (SEE COMMENTS)     Itchy throat    Walnuts OTHER (SEE COMMENTS)     Upset stomach       CURRENT MEDICATIONS:  Current Outpatient Medications   Medication Sig Dispense Refill    fluticasone-salmeterol (ADVAIR DISKUS) 250-50 MCG/ACT Inhalation Aerosol Powder, Breath Activated INHALE 1 PUFF INTO THE LUNGS EVERY 12 (TWELVE) HOURS. 60 each 3    semaglutide-weight management 0.25 MG/0.5ML Subcutaneous Solution Auto-injector Inject 0.5 mL (0.25 mg total) into the skin once a week for 4 doses. 2 mL 0    ergocalciferol 1.25 MG (78845 UT) Oral Cap Take 1 capsule (50,000 Units total) by mouth once a week for 8 doses. Then on week 9, begin taking OTC vitamin d3 800-1000iu PO daily 8 capsule 0    loratadine 10 MG Oral Tab Take 1 tablet (10 mg total) by mouth daily as needed for Allergies.      NASAL SPRAY SALINE NA by Nasal route. OTC      albuterol (VENTOLIN HFA) 108 (90 Base) MCG/ACT Inhalation Aero Soln Inhale 1 puff into the lungs every 4 (four) hours as needed for Wheezing or Shortness of Breath. 6.7 g 0       MEDICAL HISTORY:  Past Medical History:   Diagnosis Date    Allergic rhinitis     Allergies     Asthma (HCC)     Esophageal reflux      Past Surgical History:   Procedure Laterality Date           Family History   Problem Relation Age of Onset     Pulmonary Disease Father     Hypertension Mother     Asthma Mother     Heart Disorder Maternal Grandmother     Dementia Maternal Grandmother     Dementia Paternal Grandmother     Diabetes Brother     Diabetes Brother      Family Status   Relation Status    Fa     Mo Alive    MGMA     PGMA     Bro Alive    Bro (Not Specified)     Social History     Socioeconomic History    Marital status: Single   Tobacco Use    Smoking status: Never    Smokeless tobacco: Never   Vaping Use    Vaping Use: Never used   Substance and Sexual Activity    Alcohol use: Not Currently    Drug use: Never   Other Topics Concern    Caffeine Concern No    Exercise No    Seat Belt No    Special Diet No    Stress Concern No    Weight Concern Yes     Comment: Over weight       ROS:  GENERAL:  Denies recorded temperatures greater than 100.5F  RESPIRATORY:  Denies difficulty breathing    VITALS:   /88   Pulse 94   Temp 97.6 °F (36.4 °C) (Temporal)   Resp 16   Ht 5' 5\" (1.651 m)   Wt 209 lb (94.8 kg)   LMP 2024 (Approximate)   SpO2 96%   BMI 34.78 kg/m²     Reviewed by Bianca Jimenez MS, APRN, FNP-BC    PHYSICAL EXAM:    Constitutional:       Appears well.  Sitting upright on exam table.  Well developed, well nourished, and in no acute distress    GYN: External genitalia without masses, lesions, erythema, or tenderness.  Mucosa is pink in color, moist, and without signs of inflammation or irritation. Discharge is thin and white, without clumping/adhering to vaginal walls. Cervix/os pink without lesions or discoloration.  No motion tenderness. No pelvic or abdominal fullness/tenderness.   Pap test sample collected using brush.  Scant amount of bleeding.  Clinical chaperone present during exam.    ASSESSMENT & PLAN:    1. Screening for cervical cancer  - ThinPrep PAP with HPV Reflex Request; Future    2. Screening examination for STI  - Chlamydia/Gc Amplification; Future    Plan to follow-up pap every 3  years d/t hx and fhx

## 2024-03-27 ENCOUNTER — OFFICE VISIT (OUTPATIENT)
Dept: SLEEP CENTER | Age: 34
End: 2024-03-27
Attending: INTERNAL MEDICINE
Payer: MEDICAID

## 2024-03-27 DIAGNOSIS — R06.83 SNORING: ICD-10-CM

## 2024-03-27 DIAGNOSIS — R53.83 OTHER FATIGUE: ICD-10-CM

## 2024-03-27 LAB
C TRACH DNA SPEC QL NAA+PROBE: NEGATIVE
N GONORRHOEA DNA SPEC QL NAA+PROBE: NEGATIVE

## 2024-03-27 PROCEDURE — 95806 SLEEP STUDY UNATT&RESP EFFT: CPT

## 2024-03-31 LAB
.: NORMAL
.: NORMAL

## 2024-04-01 ENCOUNTER — TELEPHONE (OUTPATIENT)
Dept: FAMILY MEDICINE CLINIC | Facility: CLINIC | Age: 34
End: 2024-04-01

## 2024-04-01 LAB — HPV I/H RISK 1 DNA SPEC QL NAA+PROBE: NEGATIVE

## 2024-04-01 NOTE — TELEPHONE ENCOUNTER
----- Message from PELON Burris sent at 4/1/2024  7:24 AM CDT -----  Negative cervical cancer screening, recommending to repeat in 3 years

## 2024-05-24 ENCOUNTER — ANESTHESIA (OUTPATIENT)
Dept: ENDOSCOPY | Facility: HOSPITAL | Age: 34
End: 2024-05-24

## 2024-05-24 ENCOUNTER — HOSPITAL ENCOUNTER (OUTPATIENT)
Facility: HOSPITAL | Age: 34
Setting detail: HOSPITAL OUTPATIENT SURGERY
Discharge: HOME | End: 2024-05-24
Attending: INTERNAL MEDICINE | Admitting: INTERNAL MEDICINE

## 2024-05-24 ENCOUNTER — ANESTHESIA EVENT (OUTPATIENT)
Dept: ENDOSCOPY | Facility: HOSPITAL | Age: 34
End: 2024-05-24

## 2024-05-24 VITALS
HEART RATE: 80 BPM | SYSTOLIC BLOOD PRESSURE: 125 MMHG | RESPIRATION RATE: 12 BRPM | HEIGHT: 63 IN | OXYGEN SATURATION: 100 % | WEIGHT: 208 LBS | BODY MASS INDEX: 36.86 KG/M2 | DIASTOLIC BLOOD PRESSURE: 82 MMHG

## 2024-05-24 DIAGNOSIS — D50.9 IRON DEFICIENCY ANEMIA: ICD-10-CM

## 2024-05-24 DIAGNOSIS — K92.1 BLOOD IN STOOL: ICD-10-CM

## 2024-05-24 LAB — B-HCG UR QL: NEGATIVE

## 2024-05-24 PROCEDURE — 0DB78ZX EXCISION OF STOMACH, PYLORUS, VIA NATURAL OR ARTIFICIAL OPENING ENDOSCOPIC, DIAGNOSTIC: ICD-10-PCS | Performed by: INTERNAL MEDICINE

## 2024-05-24 PROCEDURE — 88305 TISSUE EXAM BY PATHOLOGIST: CPT | Performed by: INTERNAL MEDICINE

## 2024-05-24 PROCEDURE — 0DBN8ZX EXCISION OF SIGMOID COLON, VIA NATURAL OR ARTIFICIAL OPENING ENDOSCOPIC, DIAGNOSTIC: ICD-10-PCS | Performed by: INTERNAL MEDICINE

## 2024-05-24 PROCEDURE — 88312 SPECIAL STAINS GROUP 1: CPT | Performed by: INTERNAL MEDICINE

## 2024-05-24 PROCEDURE — 81025 URINE PREGNANCY TEST: CPT

## 2024-05-24 RX ORDER — SODIUM CHLORIDE, SODIUM LACTATE, POTASSIUM CHLORIDE, CALCIUM CHLORIDE 600; 310; 30; 20 MG/100ML; MG/100ML; MG/100ML; MG/100ML
INJECTION, SOLUTION INTRAVENOUS CONTINUOUS
Status: DISCONTINUED | OUTPATIENT
Start: 2024-05-24 | End: 2024-05-24

## 2024-05-24 NOTE — H&P
HISTORY AND PHYSICAL FOR ENDOSCOPIC PROCEDURES      Haider Sanders Patient Status:  Hospital Outpatient Surgery    10/21/1990 MRN [id number]   Location [facility] ENDOSCOPY LAB SUITES Attending Michelle Chavez MD   Hosp Day # 0 PCP Angel Stallworth DO     Date of Initial Consult:  24  Reason for Consultation:  Fe def anemia, blood in stool    Scheduled Procedure: EGD and Colonoscopy  Indications for Procedure:  Fe def anemia and Blood in stool    History of Present Illness:  Haider Sanders is a/a(n) 33 year old female who presented with complaints as stated above.    Medical History:  Past Medical History:    Allergic rhinitis    Allergies    Asthma (HCC)    Esophageal reflux    Prediabetes     Pt  Past Surgical History:   Procedure Laterality Date            reports that she has never smoked. She has never used smokeless tobacco. She reports that she does not currently use alcohol. She reports that she does not use drugs.  Family History   Problem Relation Age of Onset    Pulmonary Disease Father     Hypertension Mother     Asthma Mother     Heart Disorder Maternal Grandmother     Dementia Maternal Grandmother     Dementia Paternal Grandmother     Diabetes Brother     Diabetes Brother        Allergies:  Allergies   Allergen Reactions    Dander HIVES    Molds & Smuts HIVES    Nyquil Severe Cold-Flu  [Peg-[place] Pls Night] SWELLING    Other OTHER (SEE COMMENTS)     SNEEZING, WATERY EYES, HIVES    Shellfish-Derived Products ANAPHYLAXIS    Dust Mites ITCHING    Latex ITCHING    Seasonal OTHER (SEE COMMENTS)     Seasonal.    Shrimp OTHER (SEE COMMENTS)     Itchy throat    Walnuts OTHER (SEE COMMENTS)     Upset stomach       Medications:    Current Facility-Administered Medications:     lactated ringers infusion, , Intravenous, Continuous    Anticoagulants: None    History of Anesthesia Complications: None    Review of Systems:  General: Negative other than what was specified in the HPI  Respiratory:  Negative other than what was specified in the HPI  Cardiovascular: Negative other than what was specified in the HPI  Gastrointestinal: Negative other than what was specified in the HPI  Neurological: Negative other than what was specified in the HPI    Physical Exam:  General: AAOx3 in NAD  HEENT: No scleral icterus, no LAD  Lungs: CTA bilaterally, no wheezing or crackles  CV: RRR S1S2, no murmurs or rubs  Abdomen: Normal active bowel sounds, soft, nontender, nondistended, no rebound or guarding  Extremities: No edema or discoloration    Assessment and Plan:  Patient Active Problem List   Diagnosis    Moderate persistent asthma without complication (HCC)    Class 2 obesity without serious comorbidity with body mass index (BMI) of 35.0 to 35.9 in adult    Prediabetes         A. ASA Classification: 1. Normal healthy patient    B.  The sedation plan was explained to the patient.  The risks, benefits and alternatives to the aforementioned endoscopic evaluation(s), including but not limited to: infection, bleeding, aspiration, perforation, adverse medications reaction, missed diagnosis and missed lesions, were explained to the patient and/or family and/or POA and they voiced their understanding and agreed to undergo the procedure(s). They were given the opportunity to ask questions and all inquiries were addressed.    C.  Sedation Plan: Monitored Anesthesia Care      Michelle Chavez MD  5/24/2024  10:38 AM

## 2024-05-24 NOTE — ANESTHESIA PREPROCEDURE EVALUATION
Anesthesia PreOp Note    HPI:     Haider Sanders is a 33 year old female who presents for preoperative consultation requested by: Michelle Chavez MD    Date of Surgery: 2024    Procedure(s):  COLONOSCOPY/ ESOPHAGOGASTRODUODENOSCOPY  ESOPHAGOGASTRODUODENOSCOPY (EGD) with biopsy  Indication: Blood in stool / Iron deficiency anemia    Relevant Problems   No relevant active problems       NPO:  Last Liquid Consumption Date: 24  Last Liquid Consumption Time: 830  Last Solid Consumption Date: 24  Last Solid Consumption Time: 08  Last Liquid Consumption Date: 24          History Review:  Patient Active Problem List    Diagnosis Date Noted    Class 2 obesity without serious comorbidity with body mass index (BMI) of 35.0 to 35.9 in adult 2024    Prediabetes 2024    Moderate persistent asthma without complication (HCC) 2024       Past Medical History:    Allergic rhinitis    Allergies    Asthma (HCC)    Esophageal reflux    Prediabetes       Past Surgical History:   Procedure Laterality Date             Medications Prior to Admission   Medication Sig Dispense Refill Last Dose    fluticasone-salmeterol (ADVAIR DISKUS) 250-50 MCG/ACT Inhalation Aerosol Powder, Breath Activated INHALE 1 PUFF INTO THE LUNGS EVERY 12 (TWELVE) HOURS. 60 each 3     loratadine 10 MG Oral Tab Take 1 tablet (10 mg total) by mouth daily as needed for Allergies.       NASAL SPRAY SALINE NA by Nasal route. OTC       albuterol (VENTOLIN HFA) 108 (90 Base) MCG/ACT Inhalation Aero Soln Inhale 1 puff into the lungs every 4 (four) hours as needed for Wheezing or Shortness of Breath. 6.7 g 0  at prn    [] semaglutide-weight management 0.25 MG/0.5ML Subcutaneous Solution Auto-injector Inject 0.5 mL (0.25 mg total) into the skin once a week for 4 doses. 2 mL 0     [] ergocalciferol 1.25 MG (33907 UT) Oral Cap Take 1 capsule (50,000 Units total) by mouth once a week for 8 doses. Then on week 9, begin  taking OTC vitamin d3 800-1000iu PO daily 8 capsule 0      Current Facility-Administered Medications Ordered in Epic   Medication Dose Route Frequency Provider Last Rate Last Admin    lactated ringers infusion   Intravenous Continuous Michelle Chavez MD 20 mL/hr at 05/24/24 1055 New Bag at 05/24/24 1055     No current [facility]-ordered outpatient medications on file.       Allergies   Allergen Reactions    Dander HIVES    Molds & Smuts HIVES    Nyquil Severe Cold-Flu  [Peg-[place] Pls Night] SWELLING    Other OTHER (SEE COMMENTS)     SNEEZING, WATERY EYES, HIVES    Shellfish-Derived Products ANAPHYLAXIS    Dust Mites ITCHING    Latex ITCHING    Seasonal OTHER (SEE COMMENTS)     Seasonal.    Shrimp OTHER (SEE COMMENTS)     Itchy throat    Walnuts OTHER (SEE COMMENTS)     Upset stomach       Family History   Problem Relation Age of Onset    Pulmonary Disease Father     Hypertension Mother     Asthma Mother     Heart Disorder Maternal Grandmother     Dementia Maternal Grandmother     Dementia Paternal Grandmother     Diabetes Brother     Diabetes Brother      Social History     Socioeconomic History    Marital status: Single   Tobacco Use    Smoking status: Never    Smokeless tobacco: Never   Vaping Use    Vaping status: Never Used   Substance and Sexual Activity    Alcohol use: Not Currently    Drug use: Never   Other Topics Concern    Caffeine Concern No    Exercise No    Seat Belt No    Special Diet No    Stress Concern No    Weight Concern Yes     Comment: Over weight       Available pre-op labs reviewed.  Lab Results   Component Value Date    WBC 5.1 02/26/2024    RBC 4.77 02/26/2024    HGB 11.9 (L) 02/26/2024    HCT 38.5 02/26/2024    MCV 80.7 02/26/2024    MCH 24.9 (L) 02/26/2024    MCHC 30.9 (L) 02/26/2024    RDW 14.0 02/26/2024    .0 02/26/2024    URINEPREG Negative 05/24/2024     Lab Results   Component Value Date     (L) 02/26/2024    K 4.2 02/26/2024     02/26/2024    CO2 26.0 02/26/2024    BUN  12 02/26/2024    CREATSERUM 0.85 02/26/2024    GLU 82 02/26/2024    CA 9.4 02/26/2024          Vital Signs:  Body mass index is 36.85 kg/m².   height is 1.6 m (5' 3\") and weight is 94.3 kg (208 lb). Her blood pressure is 149/91 (abnormal) and her pulse is 92. Her respiration is 18 and oxygen saturation is 96%.   Vitals:    05/20/24 1454 05/24/24 1047   BP:  (!) 149/91   Pulse:  92   Resp:  18   SpO2:  96%   Weight: 94.3 kg (208 lb)    Height: 1.6 m (5' 3\")         Anesthesia Evaluation     Patient summary reviewed and Nursing notes reviewed    Airway   Mallampati: II  TM distance: >3 FB  Neck ROM: full  Dental - Dentition appears grossly intact     Pulmonary - normal exam   (+) asthma  Cardiovascular - negative ROS and normal exam  Exercise tolerance: good    Neuro/Psych - negative ROS     GI/Hepatic/Renal    (+) GERD    Endo/Other - negative ROS   Abdominal  - normal exam                 Anesthesia Plan:   ASA:  2  Plan:   MAC  Informed Consent Plan and Risks Discussed With:  Patient and mother      I have informed Haider Sanders and/or legal guardian or family member of the nature of the anesthetic plan, benefits, risks including possible dental damage if relevant, major complications, and any alternative forms of anesthetic management.   All of the patient's questions were answered to the best of my ability. The patient desires the anesthetic management as planned.  Bill Early MD  5/24/2024 11:27 AM  Present on Admission:  **None**

## 2024-05-24 NOTE — DISCHARGE INSTRUCTIONS
POST-EGD/COLONOSCOPY DISCHARGE INSTRUCTIONS    PROCEDURES PERFORMED   EGD with biopsies  Colonoscopy with cold forceps biopsy    ENDOSCOPIST: Michelle Chavez MD    FINDINGS:   Gastritis (inflammation of the stomach lining)  Colon polyp(s) and Internal hemorrhoids    MEDICATIONS: You may resume all other medications today    DIET: You may resume your regular diet.    BIOPSIES: Biopsies were taken.  They will be sent to pathology and results will be available in 7-10 days.    X-RAYS: No X-rays/Labs were ordered today        Activity for remainder of today:  REST TODAY  DO NOT drive or operate heavy machinery  DO NOT drink any alcoholic beverages  DO NOT sign any legal documents or make any important decisions    After your procedure(s):  It is not unusual to feel bloated or gassy .  Passing gas and belching is encouraged. Lying on your left side with your knees flexed may relieve the discomfort. A hot pack to the abdomen may also help. After your upper endoscopy, you may experience a slight sore throat which will subside. Throat lozenges or salt water gargle can be used.    FOLLOW-UP:  Contact the office at [phone] if a follow-up appointment is needed or if you develop any of the following:    Severe abdominal pain/discomfort   Excessive bleeding  Black tarry stool  Difficulty breathing/swallowing  Persistent nausea/vomiting    Fever above 100 degrees or chills      Home Care Instructions for Colonoscopy with Sedation    Diet:  - Resume your regular diet as tolerated unless otherwise instructed.  - Start with light meals to minimize bloating.  - Do not drink alcohol today.    Medication:  - If you have questions about resuming your normal medications, please contact your Primary Care Physician.    Activities:  - Take it easy today. Do not return to work today.  - Do not drive today.  - Do not operate any machinery today (including kitchen equipment).    Colonoscopy:  - You may notice some rectal \"spotting\" (a little  blood on the toilet tissue) for a day or two after the exam. This is normal.  - If you experience any rectal bleeding (not spotting), persistent tenderness or sharp severe abdominal pains, oral temperature over 100 degrees Fahrenheit, light-headedness or dizziness, or any other problems, contact your doctor.    **If unable to reach your doctor, please go to the [facility] Emergency Room**    - Your referring physician will receive a full report of your examination.  - If you do not hear from your doctor's office within two weeks of your biopsy, please call them for your results.    You may be able to see your laboratory results in [employer] between 4 and 7 business days.  In some cases, your physician may not have viewed the results before they are released to [employer].  If you have questions regarding your results contact the physician who ordered the test/exam by phone or via [employer] by choosing \"Ask a Medical Question.\"Home Care Instructions for Gastroscopy with Sedation    Diet:  - Resume your regular diet as tolerated unless otherwise instructed.  - Start with light meals to minimize bloating.  - Do not drink alcohol today.    Medication:  - If you have questions about resuming your normal medications, please contact your Primary Care Physician.    Activities:  - Take it easy today. Do not return to work today.  - Do not drive today.  - Do not operate any machinery today (including kitchen equipment).    Gastroscopy:  - You may have a sore throat for 2-3 days following the exam. This is normal. Gargling with warm salt water (1/2 tsp salt to 1 glass warm water) or using throat lozenges will help.  - If you experience any sharp pain in your neck, abdomen or chest, vomiting of blood, oral temperature over 100 degrees Fahrenheit, light-headedness or dizziness, or any other problems, contact your doctor.    **If unable to reach your doctor, please go to the [facility] Emergency  Room**    - Your referring physician will receive a full report of your examination.  - If you do not hear from your doctor's office within two weeks of your biopsy, please call them for your results.    You may be able to see your laboratory results in [employer] between 4 and 7 business days.  In some cases, your physician may not have viewed the results before they are released to [employer].  If you have questions regarding your results contact the physician who ordered the test/exam by phone or via [employer] by choosing \"Ask a Medical Question.\"

## 2024-05-24 NOTE — ANESTHESIA POSTPROCEDURE EVALUATION
Patient: Haider Sanders    Procedure Summary       Date: 05/24/24 Room / Location: [facility] ENDOSCOPY 05 / [facility] ENDOSCOPY    Anesthesia Start: 1131 Anesthesia Stop: 1206    Procedures:       COLONOSCOPY with polypectomy      ESOPHAGOGASTRODUODENOSCOPY (EGD) with biopsy Diagnosis:       Blood in stool      Iron deficiency anemia      (Polyp hemorrhoids Gastritis)    Surgeons: Michelle Chavez MD Anesthesiologist: Bill Early MD    Anesthesia Type: MAC ASA Status: 2            Anesthesia Type: MAC    Vitals Value Taken Time   /82 05/24/24 1225   Temp 37C 05/24/24 1231   Pulse 78 05/24/24 1230   Resp 15 05/24/24 1230   SpO2 100 % 05/24/24 1228   Vitals shown include unfiled device data.    [facility] AN Post Evaluation:   Patient Evaluated in PACU  Patient Participation: complete - patient participated  Level of Consciousness: sleepy but conscious  Pain Score: 0  Pain Management: adequate  Airway Patency:patent  Dental exam unchanged from preop  Yes    Cardiovascular Status: acceptable  Respiratory Status: acceptable  Postoperative Hydration acceptable      Bill Early MD  5/24/2024 12:31 PM

## 2024-05-24 NOTE — OPERATIVE REPORT
EGD/Colonoscopy Operative Report    Haider Sanders Patient Status:  Hospital Outpatient Surgery    10/21/1990 MRN [id number]   Location [facility] ENDOSCOPY LAB SUITES Attending Michelle Chavez MD   Hosp Day #   0 PCP Angel Stallworth DO     Pre-Operative Diagnosis: Blood in stool / Iron deficiency anemia    Date of previous colonoscopy: none    Post-Operative Diagnosis:   Gastritis  Colon polyps and Internal Hemorrhoids     Procedure Performed:   EGD with biopsies  Colonoscopy with cold forceps biopsy      Pre-procedure: The patient was assessed in the procedure room immediately before induction of sedation which included, at a minimum, vital signs, NPO status, and airway assessment.  The patient was deemed and appropriate candidate for procedural sedation.    Informed Consent: Informed consent for both procedures and sedation were obtained from the patient.  The risks, benefits and alternatives to the procedures including potentially life-threatening complications of sedation, bleeding, perforation, missed lesions or need for repeat endoscopy were reviewed along with the possible need for hospitalization, surgical management, transfusion of blood products or repeat endoscopy should one of these complications arise.  The patient and/or POA voiced their understanding and was agreeable to proceed.     Sedation Type: MAC-Patient received sedation with monitored anesthesia provided by an anesthesiologist    EGD Procedure Description: The patient was placed in the left lateral decubitus position.  A bite block was placed in the patient's mouth and the endoscope was passed through the mouth under direct vision and advanced to the second portion of the duodenum.  The endoscope was then withdrawn to examine the duodenal bulb, pylorus and gastric antrum, body, incisura and fundus and then retroflexed to  to examine the GE junction and cardia. The upper endoscopy was  performed without difficulty, the patient tolerated the procedure well with no immediate complications.     Colonoscopy Procedure Description: The patient was placed in the left lateral decubitus position.  After careful digital rectal examination, the Adult colonoscope was inserted into the rectum under direct vision and advanced to the level of the terminal ileum under direct visualization. The cecum was identified by landmarks, including the appendiceal orifice and ileoceccal valve. Careful examination of the entire colon was performed during withdrawal of the endoscope. The scope was withdrawn to the rectum and retroflexion was performed.  The colonoscopy was performed without difficulty and the patient tolerated the procedure well with no immediate complications. The patient was transferred to the recovery area in stable condition.     Quality of Preparation/Aronchick Bowel Prep Scale: 2: Good (Clear liquid covering 5%-25% of mucosa, but >90% of mucosa seen)  Cecum Withdrawal Time: 8 min    Findings: A 4mm sessile polyp was noted in the sigmoid colon and removed completely with cold biopsy forceps for histology.  A small internal hemorrhoid was noted and the remainder of the exam, including the terminal ileum, was unremarkable otherwise.    Upper endoscopy revealed a normal esophagus, mild antral erythema which was biopsied with cold biopsy forceps for histology, and an otherwise unremarkable duodenal bulb and second portion of the duodenum.      Impression:   Gastritis (inflammation of the stomach lining)  Colon polyp(s) and Internal hemorrhoids    Recommendations: Discharge patient when discharge criteria are met., Await pathology results, [facility] results or await call from office. If you do not receive your results within a week, please call the office., and Depending on pathology results, surveillance interval for next colonoscopy will be determined.    Discharge:  The  patient was given an after visit summary detailing the procedure, findings, recommendations and follow up plans.     Michelle Chavez MD  5/24/2024  11:49 AM

## 2024-05-31 ENCOUNTER — PATIENT MESSAGE (OUTPATIENT)
Dept: FAMILY MEDICINE CLINIC | Facility: CLINIC | Age: 34
End: 2024-05-31

## 2024-05-31 DIAGNOSIS — K63.5 POLYP OF SIGMOID COLON, UNSPECIFIED TYPE: ICD-10-CM

## 2024-05-31 DIAGNOSIS — K29.70 GASTRITIS, PRESENCE OF BLEEDING UNSPECIFIED, UNSPECIFIED CHRONICITY, UNSPECIFIED GASTRITIS TYPE: Primary | ICD-10-CM

## 2024-05-31 NOTE — TELEPHONE ENCOUNTER
From: Haider Sanders  To: Bianca Jimenez  Sent: 5/31/2024 1:07 PM CDT  Subject: Capsule studies     Good afternoon I hope all is well I was reaching out because I have a capsule studies set up 6/4/2024 at 6:30am and [facility] gastroenterologist are asking for a referral because my pcp is not apart of duly . The procedure will be held at [address] [place] [address] [place] [id number]  Phone number [phone]    Thanks

## 2024-06-07 ENCOUNTER — PATIENT MESSAGE (OUTPATIENT)
Dept: FAMILY MEDICINE CLINIC | Facility: CLINIC | Age: 34
End: 2024-06-07

## 2024-06-07 NOTE — TELEPHONE ENCOUNTER
Please inform patient of the following:    Per our referral department, the ordering Provider/Specialist, Dr Michelle Chavez, is responsible of placing order and obtaining authorization for this procedure.

## 2024-06-07 NOTE — TELEPHONE ENCOUNTER
From: Haider Sanders  To: Bianca Jimenez  Sent: 6/7/2024 10:21 AM CDT  Subject: Capsule endoscopy    Hi , I was reaching out because the referral that was recently sent over to [facility] wasn’t correct .    The referral should be for a capsule endoscopy Referral not a consultation.(I was already seen by the physician )     What should be included is the CPT Code : 55164  Associated diagnosis code: D50.9- Iron deficiency   Needs authorization for procedure from my provider who is Bianca Jimenez    Gastroenterologist: Michelle SOSA MD (Gastroenterologist)  Address of procedure [address] [place], [place] [id number]

## 2024-06-10 NOTE — TELEPHONE ENCOUNTER
Pt states needs she needs a referral from our office because she is going outside [facility].  Pt states referral  should not say a consult but needs to say procedure being done - capsule study. Needs to also state doctor performing procedure: Dr. Keri Chavez, location of [address] [place] [address].  Also needs to include CPT 28719 and diagnosis code:  D50.9 iron deficiency.   Please advise.  Thank you!

## 2024-06-10 NOTE — TELEPHONE ENCOUNTER
Referral Information:  Order Date: May 31, 2024       Epic Order #: [id number]   Referral Type: Gastro Referral - External Dx: Gastritis, presence of bleeding unspecified, unspecified chronicity, unspecified gastritis type (K29.70)  Polyp of sigmoid colon, unspecified type (K63.5)   Signed Referral [place]:       Referral to gastro entered 5/31/24 06/07/2024:  Referral department advised us that the ordering Provider/Specialist, Dr. Michelle Chavez, is responsible of placing order and obtaining authorization for this procedure.

## 2024-06-10 NOTE — TELEPHONE ENCOUNTER
Sunny pulled provider aside while in hallway between patients stating that patient on phone and \"needs the okay from us to have the procedure tomorrow.\"    APRN advised sunny that Quintonla needs an appointment in order to provide medical clearance.  Sunny verbalized understanding and will help patient schedule.

## 2024-06-10 NOTE — TELEPHONE ENCOUNTER
Left message for pt and sent MyChart Message regarding referral    Primary care physician office places referral for consults with specialists. Once the specilist needs to do a procedure- they need to obtain authorization for procedures- this was discyssed with our referrals department and was documented in the 6/7/24 telephone encounter- the Gastro will need to get authorization.

## 2024-07-29 ENCOUNTER — PATIENT OUTREACH (OUTPATIENT)
Dept: CASE MANAGEMENT | Age: 34
End: 2024-07-29

## 2024-07-29 NOTE — PROCEDURES
The office order for PCP removal request is Approved and finalized on July 29, 2024.    Removed Angel Stallworth DO as the patient's Primary Care Physician

## 2024-09-03 ENCOUNTER — NURSE ONLY (OUTPATIENT)
Dept: INTERNAL MEDICINE CLINIC | Facility: HOSPITAL | Age: 34
End: 2024-09-03
Attending: PREVENTIVE MEDICINE

## 2024-09-03 DIAGNOSIS — Z00.00 WELLNESS EXAMINATION: Primary | ICD-10-CM

## 2024-09-03 LAB
RUBV IGG SER QL: POSITIVE
RUBV IGG SER-ACNC: 85 IU/ML (ref 10–?)

## 2024-09-03 PROCEDURE — 86762 RUBELLA ANTIBODY: CPT

## 2024-09-03 PROCEDURE — 86787 VARICELLA-ZOSTER ANTIBODY: CPT

## 2024-09-03 PROCEDURE — 86735 MUMPS ANTIBODY: CPT

## 2024-09-03 PROCEDURE — 86765 RUBEOLA ANTIBODY: CPT

## 2024-09-03 PROCEDURE — 86480 TB TEST CELL IMMUN MEASURE: CPT

## 2024-09-04 LAB
M TB IFN-G CD4+ T-CELLS BLD-ACNC: 0 IU/ML
M TB TUBERC IFN-G BLD QL: NEGATIVE
M TB TUBERC IGNF/MITOGEN IGNF CONTROL: >10 IU/ML
MEV IGG SER-ACNC: >300 AU/ML (ref 16.5–?)
MUV IGG SER IA-ACNC: 236 AU/ML (ref 11–?)
QFT TB1 AG MINUS NIL: 0.02 IU/ML
QFT TB2 AG MINUS NIL: 0 IU/ML
VZV IGG SER IA-ACNC: 195.3 (ref 165–?)

## 2024-10-11 NOTE — PROCEDURES
[facility]       Accredited by the American Academy of Sleep Medicine (AASM)    PATIENT'S NAME:        ERNESTO SERRANO  ATTENDING PHYSICIAN:   Angel Stallworth D.O.  REFERRING PHYSICIAN:   PELON Burris  PATIENT ACCOUNT #:     [id number]        LOCATION:       [facility] Center  MEDICAL RECORD #:      [id number]        YOB: 1990  DATE OF STUDY:         03/27/2024    SLEEP STUDY REPORT    STUDY TYPE:  Home sleep test.    ORDERING PROVIDER:  PELON Burris    INDICATION:  Suspected obstructive sleep apnea (ICD-10 code G47.33), in a patient with snoring, fatigue, unrefreshing sleep, a body mass index of 35.8, excessive daytime somnolence, an Onemo Sleepiness Scale score of 2/24.    RESULTS:  The patient underwent home sleep test with measurement of her nasal airflow, oximetry, body position, and heart rate.  I have reviewed the entirety of the raw data of this study.  During the study, total recording time is 6 hours 36 minutes.  The study start time is 10:24 p.m.; the study end time is 5:01 a.m.  The apnea plus hypopnea index is 1.6 events per hour, the apnea index is 0 events per hour, and the hypopnea index is 1.6 events per hour.  The supine apnea plus hypopnea index is 2.4 events per hour.  The average oxygen saturation is 97%, the lowest oxygen saturation is 89%, and the patient spent 0% of the test with saturations 88% or less.  The average heart rate is 83 beats per minute.  The patient spent approximately 70% of the test in the supine position.    INTERPRETATION:  The data generated from this study shows no evidence of significant sleep-disordered breathing.  Snoring was appreciated.    RECOMMENDATIONS:    1.   Clinical correlation is required.    2.   Avoid alcohol.    3.   Avoid sedating drug.  4.   Patient should not drive if at all sleepy.    5.   Weight loss.      Please do not hesitate to contact me if there is any question whatsoever  Right arm; regarding interpretation of this study.    Dictated By Addy Mckenzie MD  d: 03/29/2024 16:10:56  t: 03/29/2024 16:52:31  [facility] [id number]  /    cc: PELON Burris D.O.

## 2024-10-22 DIAGNOSIS — J45.40 MODERATE PERSISTENT ASTHMA WITHOUT COMPLICATION (HCC): ICD-10-CM

## 2024-10-23 RX ORDER — FLUTICASONE PROPIONATE AND SALMETEROL 250; 50 UG/1; UG/1
1 POWDER RESPIRATORY (INHALATION) EVERY 12 HOURS
Qty: 60 EACH | Refills: 2 | Status: SHIPPED | OUTPATIENT
Start: 2024-10-23 | End: 2025-01-21

## 2024-10-23 RX ORDER — ALBUTEROL SULFATE 90 UG/1
1 INHALANT RESPIRATORY (INHALATION) EVERY 4 HOURS PRN
Qty: 6.7 G | Refills: 2 | Status: SHIPPED | OUTPATIENT
Start: 2024-10-23

## 2024-10-23 NOTE — TELEPHONE ENCOUNTER
albuterol (VENTOLIN HFA) 108 (90 Base) MCG/ACT Inhalation Aero Soln   Last refill: 10/3/2023, for 6.7 g, 0 refill      fluticasone-salmeterol (ADVAIR DISKUS) 250-50 MCG/ACT Inhalation Aerosol Powder, Breath Activated   Last refill: 3/16/2024, #60 each, 3 refill    Asthma & COPD Medication Protocol Lppuec41/22/2024 06:08 PM   Protocol Details Asthma Action Score greater than or equal to 20    Appointment in past 6 or next 3 months    AAP/ACT given in last 12 months        LOV:3/26/2024  Last Px: 2/26/2024

## 2025-02-14 ENCOUNTER — OFFICE VISIT (OUTPATIENT)
Dept: FAMILY MEDICINE CLINIC | Facility: CLINIC | Age: 35
End: 2025-02-14
Payer: COMMERCIAL

## 2025-02-14 ENCOUNTER — LAB ENCOUNTER (OUTPATIENT)
Dept: LAB | Age: 35
End: 2025-02-14
Attending: FAMILY MEDICINE
Payer: COMMERCIAL

## 2025-02-14 VITALS
HEIGHT: 64 IN | TEMPERATURE: 98 F | SYSTOLIC BLOOD PRESSURE: 105 MMHG | DIASTOLIC BLOOD PRESSURE: 61 MMHG | WEIGHT: 205 LBS | HEART RATE: 81 BPM | BODY MASS INDEX: 35 KG/M2 | RESPIRATION RATE: 16 BRPM

## 2025-02-14 DIAGNOSIS — E55.9 VITAMIN D DEFICIENCY: ICD-10-CM

## 2025-02-14 DIAGNOSIS — E66.812 CLASS 2 SEVERE OBESITY WITH SERIOUS COMORBIDITY AND BODY MASS INDEX (BMI) OF 35.0 TO 35.9 IN ADULT, UNSPECIFIED OBESITY TYPE (HCC): ICD-10-CM

## 2025-02-14 DIAGNOSIS — Z76.89 ESTABLISHING CARE WITH NEW DOCTOR, ENCOUNTER FOR: ICD-10-CM

## 2025-02-14 DIAGNOSIS — Z13.1 DIABETES MELLITUS SCREENING: ICD-10-CM

## 2025-02-14 DIAGNOSIS — J45.40 MODERATE PERSISTENT ASTHMA WITHOUT COMPLICATION (HCC): ICD-10-CM

## 2025-02-14 DIAGNOSIS — Z00.00 PHYSICAL EXAM, ROUTINE: Primary | ICD-10-CM

## 2025-02-14 DIAGNOSIS — E66.01 CLASS 2 SEVERE OBESITY WITH SERIOUS COMORBIDITY AND BODY MASS INDEX (BMI) OF 35.0 TO 35.9 IN ADULT, UNSPECIFIED OBESITY TYPE (HCC): ICD-10-CM

## 2025-02-14 DIAGNOSIS — Z28.21 PNEUMOCOCCAL VACCINATION DECLINED BY PATIENT: ICD-10-CM

## 2025-02-14 LAB
ALBUMIN SERPL-MCNC: 5.3 G/DL (ref 3.2–4.8)
ALBUMIN/GLOB SERPL: 2 {RATIO} (ref 1–2)
ALP LIVER SERPL-CCNC: 107 U/L
ALT SERPL-CCNC: 22 U/L
ANION GAP SERPL CALC-SCNC: 12 MMOL/L (ref 0–18)
AST SERPL-CCNC: 18 U/L (ref ?–34)
BILIRUB SERPL-MCNC: 0.5 MG/DL (ref 0.3–1.2)
BUN BLD-MCNC: 7 MG/DL (ref 9–23)
BUN/CREAT SERPL: 8.3 (ref 10–20)
CALCIUM BLD-MCNC: 10 MG/DL (ref 8.7–10.4)
CHLORIDE SERPL-SCNC: 105 MMOL/L (ref 98–112)
CHOLEST SERPL-MCNC: 221 MG/DL (ref ?–200)
CO2 SERPL-SCNC: 24 MMOL/L (ref 21–32)
CREAT BLD-MCNC: 0.84 MG/DL
DEPRECATED RDW RBC AUTO: 41.7 FL (ref 35.1–46.3)
EGFRCR SERPLBLD CKD-EPI 2021: 92 ML/MIN/1.73M2 (ref 60–?)
ERYTHROCYTE [DISTWIDTH] IN BLOOD BY AUTOMATED COUNT: 14.5 % (ref 11–15)
EST. AVERAGE GLUCOSE BLD GHB EST-MCNC: 126 MG/DL (ref 68–126)
FASTING PATIENT LIPID ANSWER: YES
FASTING STATUS PATIENT QL REPORTED: YES
GLOBULIN PLAS-MCNC: 2.7 G/DL (ref 2–3.5)
GLUCOSE BLD-MCNC: 72 MG/DL (ref 70–99)
HBA1C MFR BLD: 6 % (ref ?–5.7)
HCT VFR BLD AUTO: 40.2 %
HDLC SERPL-MCNC: 56 MG/DL (ref 40–59)
HGB BLD-MCNC: 12.3 G/DL
LDLC SERPL CALC-MCNC: 153 MG/DL (ref ?–100)
MCH RBC QN AUTO: 24.7 PG (ref 26–34)
MCHC RBC AUTO-ENTMCNC: 30.6 G/DL (ref 31–37)
MCV RBC AUTO: 80.7 FL
NONHDLC SERPL-MCNC: 165 MG/DL (ref ?–130)
OSMOLALITY SERPL CALC.SUM OF ELEC: 289 MOSM/KG (ref 275–295)
PLATELET # BLD AUTO: 402 10(3)UL (ref 150–450)
POTASSIUM SERPL-SCNC: 4.4 MMOL/L (ref 3.5–5.1)
PROT SERPL-MCNC: 8 G/DL (ref 5.7–8.2)
RBC # BLD AUTO: 4.98 X10(6)UL
SODIUM SERPL-SCNC: 141 MMOL/L (ref 136–145)
TRIGL SERPL-MCNC: 69 MG/DL (ref 30–149)
TSI SER-ACNC: 2.22 UIU/ML (ref 0.55–4.78)
VIT D+METAB SERPL-MCNC: 23.2 NG/ML (ref 30–100)
VLDLC SERPL CALC-MCNC: 13 MG/DL (ref 0–30)
WBC # BLD AUTO: 6.3 X10(3) UL (ref 4–11)

## 2025-02-14 PROCEDURE — 84443 ASSAY THYROID STIM HORMONE: CPT | Performed by: FAMILY MEDICINE

## 2025-02-14 PROCEDURE — 36415 COLL VENOUS BLD VENIPUNCTURE: CPT | Performed by: FAMILY MEDICINE

## 2025-02-14 PROCEDURE — 82306 VITAMIN D 25 HYDROXY: CPT | Performed by: FAMILY MEDICINE

## 2025-02-14 PROCEDURE — 99385 PREV VISIT NEW AGE 18-39: CPT | Performed by: FAMILY MEDICINE

## 2025-02-14 PROCEDURE — 83036 HEMOGLOBIN GLYCOSYLATED A1C: CPT | Performed by: FAMILY MEDICINE

## 2025-02-14 PROCEDURE — 80053 COMPREHEN METABOLIC PANEL: CPT | Performed by: FAMILY MEDICINE

## 2025-02-14 PROCEDURE — 80061 LIPID PANEL: CPT | Performed by: FAMILY MEDICINE

## 2025-02-14 PROCEDURE — 85027 COMPLETE CBC AUTOMATED: CPT | Performed by: FAMILY MEDICINE

## 2025-02-14 RX ORDER — EPINEPHRINE 0.3 MG/.3ML
SOLUTION INTRAMUSCULAR; SUBCUTANEOUS
COMMUNITY
End: 2025-02-14

## 2025-02-14 RX ORDER — FLUTICASONE PROPIONATE AND SALMETEROL 250; 50 UG/1; UG/1
1 POWDER RESPIRATORY (INHALATION) 2 TIMES DAILY
COMMUNITY
End: 2025-02-14

## 2025-02-14 NOTE — PATIENT INSTRUCTIONS
All adult screening ordered and done appropriate for patient's age and gender and risk factors and complaints.  Recommend weight loss via daily exercising and consistent healthy dietary changes.  Encouraged physical fitness and daily physical activity daily.  Monitor blood pressures and record at home. Limit salt intake.  Low-dose Wegovy is going to be initiated in lieu of the patient's BMI.  Patient motivated for weight loss.

## 2025-02-14 NOTE — PROGRESS NOTES
Subjective:     Patient ID: Haider Sanders is a 34 year old female.    This patient is a 34-year-old moderately persistent and currently asymptomatic asthmatic -American female here to establish care with a new PCP and for a complete preventive care physical and for status update on any confirmed chronic medical illnesses and follow up on any previous labs or procedures that were suggestive or in need of further work up. Bowel and bladder functions are intact.    Mother: alive, hypertension, asthma    Father: , emphysema, dialysis    Siblings: x 2 brother (55)-type 2 diabetic, other brother healthy    Children: x 1    Allergies: walnuts    Occupation: endoscopy tech @ [employer]    Surgery: none    Workout: none    Tobacco: none    Patient had an A1c that measured into the prediabetic range at 5.9 and needs to recheck.  Patient denies urinary frequency or visual disturbances or any other unexplained fatigue and/or dizziness or equilibrium problems.    Patient declines pneumococcal vaccine.    Patient is seeking to get a vitamin D level rechecked.            History/Other:   Review of Systems  Current Outpatient Medications   Medication Sig Dispense Refill    fluticasone-salmeterol 250-50 MCG/ACT Inhalation Aerosol Powder, Breath Activated Inhale 1 puff into the lungs 2 (two) times daily.      albuterol (VENTOLIN HFA) 108 (90 Base) MCG/ACT Inhalation Aero Soln Inhale 1 puff into the lungs every 4 (four) hours as needed for Wheezing or Shortness of Breath. 6.7 g 2    EPINEPHrine (SYMJEPI) 0.3 MG/0.3ML Injection Solution Prefilled Syringe 0.3 mg/0.3 mL       Allergies:Allergies[1]    Past Medical History:    Allergic rhinitis    Allergies    Asthma (HCC)    Esophageal reflux    Prediabetes      Past Surgical History:   Procedure Laterality Date    Colonoscopy N/A 2024    Procedure: COLONOSCOPY with polypectomy;  Surgeon: Michelle Chavez MD;  Location: [facility] ENDOSCOPY            Family History    Problem Relation Age of Onset    Pulmonary Disease Father     Hypertension Mother     Asthma Mother     Heart Disorder Maternal Grandmother     Dementia Maternal Grandmother     Dementia Paternal Grandmother     Diabetes Brother     Diabetes Brother       Social History:   Social History     Socioeconomic History    Marital status: Single   Tobacco Use    Smoking status: Never    Smokeless tobacco: Never   Vaping Use    Vaping status: Never Used   Substance and Sexual Activity    Alcohol use: Not Currently    Drug use: Never   Other Topics Concern    Caffeine Concern No    Exercise No    Seat Belt No    Special Diet No    Stress Concern No    Weight Concern Yes     Comment: Over weight     Social Drivers of Health      Received from [facility], [facility]    Housing Stability        Objective:   Vitals:    02/14/25 1050   BP: 105/61   Pulse: 81   Resp: 16   Temp: 97.8 °F (36.6 °C)       Physical Exam  Constitutional:       General: She is not in acute distress.     Appearance: Normal appearance. She is not ill-appearing.   HENT:      Right Ear: Tympanic membrane normal.      Left Ear: Tympanic membrane normal.      Nose: Nose normal.      Mouth/Throat:      Mouth: Mucous membranes are moist.   Neck:      Thyroid: No thyromegaly.   Cardiovascular:      Rate and Rhythm: Normal rate and regular rhythm.      Heart sounds:      No gallop.   Pulmonary:      Effort: Pulmonary effort is normal.      Breath sounds: Normal breath sounds.   Abdominal:      General: Bowel sounds are normal.      Palpations: Abdomen is soft. There is no mass.      Tenderness: There is no abdominal tenderness.   Neurological:      Mental Status: She is alert and oriented to person, place, and time.   Psychiatric:         Mood and Affect: Mood normal.         Assessment & Plan:   1. Physical exam, routine  General well exam and following labs have been ordered.  - CBC, Platelet, No Differential [E];  Future  - Comp Metabolic Panel (14) [E]; Future  - Lipid Panel [E]; Future  - TSH W Reflex To Free T4 [E]; Future    2. Establishing care with new doctor, encounter for  Care has been established.    3. Moderate persistent asthma without complication (HCC)  Currently asymptomatic.  Maintenance inhaler use encouraged and recommended.    4. Diabetes mellitus screening  Screened.  Updated.  - Hemoglobin A1C [E]; Future.  A1c is 6.0.  Prediabetic range remains.    5. Vitamin D deficiency  Status update via lab test.  Declined by patient.  - Vitamin D [E]; Future    6. Pneumococcal vaccination declined by patient  Declined by patient.  - Prevnar 20 (PCV20) [18267]    7. Class 2 severe obesity with serious comorbidity and body mass index (BMI) of 35.0 to 35.9 in adult, unspecified obesity type (HCC)  Recommended and prescribed.  - semaglutide-weight management 0.25 MG/0.5ML Subcutaneous Solution Auto-injector; Inject 0.5 mL (0.25 mg total) into the skin once a week for 4 doses.  Dispense: 2 mL; Refill: 0      No orders of the defined types were placed in this encounter.      Meds This Visit:  Requested Prescriptions      No prescriptions requested or ordered in this encounter       Imaging & Referrals:  PCV20 VACCINE FOR INTRAMUSCULAR USE     Patient Instructions   All adult screening ordered and done appropriate for patient's age and gender and risk factors and complaints.  Recommend weight loss via daily exercising and consistent healthy dietary changes.  Encouraged physical fitness and daily physical activity daily.  Monitor blood pressures and record at home. Limit salt intake.  Low-dose Wegovy is going to be initiated in lieu of the patient's BMI.  Patient motivated for weight loss.    Return in about 1 year (around 2/14/2026), or if symptoms worsen or fail to improve.         [1]   Allergies  Allergen Reactions    Dander HIVES    Molds & Smuts HIVES    Nyquil Severe Cold-Flu  [Peg-Rothsay Pls Night] SWELLING     Other OTHER (SEE COMMENTS)     SNEEZING, WATERY EYES, HIVES    Shellfish-Derived Products ANAPHYLAXIS    Dust Mites ITCHING    Latex ITCHING    Seasonal OTHER (SEE COMMENTS)     Seasonal.    Walnuts OTHER (SEE COMMENTS)     Upset stomach

## 2025-02-16 DIAGNOSIS — E55.9 VITAMIN D INSUFFICIENCY: Primary | ICD-10-CM

## 2025-02-16 RX ORDER — ERGOCALCIFEROL 1.25 MG/1
50000 CAPSULE, LIQUID FILLED ORAL WEEKLY
Qty: 12 CAPSULE | Refills: 0 | Status: SHIPPED | OUTPATIENT
Start: 2025-02-16 | End: 2025-05-11

## 2025-02-17 ENCOUNTER — TELEPHONE (OUTPATIENT)
Dept: FAMILY MEDICINE CLINIC | Facility: CLINIC | Age: 35
End: 2025-02-17

## 2025-02-17 ENCOUNTER — PATIENT MESSAGE (OUTPATIENT)
Dept: FAMILY MEDICINE CLINIC | Facility: CLINIC | Age: 35
End: 2025-02-17

## 2025-02-17 DIAGNOSIS — E66.812 CLASS 2 SEVERE OBESITY WITH SERIOUS COMORBIDITY AND BODY MASS INDEX (BMI) OF 35.0 TO 35.9 IN ADULT, UNSPECIFIED OBESITY TYPE (HCC): ICD-10-CM

## 2025-02-17 DIAGNOSIS — E66.01 CLASS 2 SEVERE OBESITY WITH SERIOUS COMORBIDITY AND BODY MASS INDEX (BMI) OF 35.0 TO 35.9 IN ADULT, UNSPECIFIED OBESITY TYPE (HCC): ICD-10-CM

## 2025-02-17 NOTE — TELEPHONE ENCOUNTER
[employer] message sent to patient asking to confirm if Wegovy Rx was transferred to [facility] as required by insurance plan.

## 2025-02-17 NOTE — TELEPHONE ENCOUNTER
semaglutide-weight management 0.25 MG/0.5ML Subcutaneous Solution Auto-injector, Inject 0.5 mL (0.25 mg total) into the skin once a week for 4 doses., Disp: 2 mL, Rfl: 0        Key: [id number]  Patient last name: Tommy  : 10/21/1990

## 2025-02-19 ENCOUNTER — HOSPITAL ENCOUNTER (OUTPATIENT)
Age: 35
Discharge: HOME | End: 2025-02-19
Payer: COMMERCIAL

## 2025-02-19 ENCOUNTER — APPOINTMENT (OUTPATIENT)
Dept: GENERAL RADIOLOGY | Age: 35
End: 2025-02-19
Attending: Physician Assistant
Payer: COMMERCIAL

## 2025-02-19 VITALS
DIASTOLIC BLOOD PRESSURE: 89 MMHG | OXYGEN SATURATION: 100 % | RESPIRATION RATE: 18 BRPM | SYSTOLIC BLOOD PRESSURE: 138 MMHG | TEMPERATURE: 99 F | HEART RATE: 97 BPM

## 2025-02-19 DIAGNOSIS — J45.901 EXACERBATION OF ASTHMA, UNSPECIFIED ASTHMA SEVERITY, UNSPECIFIED WHETHER PERSISTENT (HCC): Primary | ICD-10-CM

## 2025-02-19 LAB
POCT INFLUENZA A: NEGATIVE
POCT INFLUENZA B: NEGATIVE
SARS-COV-2 RNA RESP QL NAA+PROBE: NOT DETECTED

## 2025-02-19 PROCEDURE — 94640 AIRWAY INHALATION TREATMENT: CPT | Performed by: PHYSICIAN ASSISTANT

## 2025-02-19 PROCEDURE — 71046 X-RAY EXAM CHEST 2 VIEWS: CPT | Performed by: PHYSICIAN ASSISTANT

## 2025-02-19 PROCEDURE — 99204 OFFICE O/P NEW MOD 45 MIN: CPT | Performed by: PHYSICIAN ASSISTANT

## 2025-02-19 PROCEDURE — U0002 COVID-19 LAB TEST NON-CDC: HCPCS | Performed by: PHYSICIAN ASSISTANT

## 2025-02-19 PROCEDURE — 87502 INFLUENZA DNA AMP PROBE: CPT | Performed by: PHYSICIAN ASSISTANT

## 2025-02-19 RX ORDER — IPRATROPIUM BROMIDE AND ALBUTEROL SULFATE 2.5; .5 MG/3ML; MG/3ML
3 SOLUTION RESPIRATORY (INHALATION) ONCE
Status: COMPLETED | OUTPATIENT
Start: 2025-02-19 | End: 2025-02-19

## 2025-02-19 RX ORDER — PREDNISONE 20 MG/1
40 TABLET ORAL DAILY
Qty: 10 TABLET | Refills: 0 | Status: SHIPPED | OUTPATIENT
Start: 2025-02-19 | End: 2025-02-24

## 2025-02-19 NOTE — ED PROVIDER NOTES
Chief Complaint   Patient presents with    Shortness Of Breath       History obtained from: patient   services not used    HPI:     Haider Sanders is a 34 year old female with history of asthma who presents with shortness of breath since yesterday. Patient states she felt \"winded\" at work yesterday and symptoms persisting today. Patient also endorses mild chest tightness and mild fatigue. Patient states symptoms feel similar to previous asthma exacerbations. Patient uses her Advair inhaler daily and used her albuterol inhaler today without significant relief. Patient states she is wondering if she would benefit from prednisone. Denies chest pain at rest, palpitations, dizziness, lightheadedness, syncope, leg pain or swelling, cough, fevers, chills, headache.     Patient was recently prescribed Vit D and Wegovy by PCP but has not started taking them yet. Patient only taking Advair and albuterol inhaler. No OCPs.     PMH  Past Medical History:    Allergic rhinitis    Allergies    Asthma (HCC)    Esophageal reflux    Prediabetes       PFSH    PFSH asessment screens reviewed and agree.  Nurses notes reviewed I agree with documentation.    Family History   Problem Relation Age of Onset    Pulmonary Disease Father     Hypertension Mother     Asthma Mother     Heart Disorder Maternal Grandmother     Dementia Maternal Grandmother     Dementia Paternal Grandmother     Diabetes Brother     Diabetes Brother      Family history reviewed with patient/caregiver and is not pertinent to presenting problem.  Social History     Socioeconomic History    Marital status: Single     Spouse name: Not on file    Number of children: Not on file    Years of education: Not on file    Highest education level: Not on file   Occupational History    Not on file   Tobacco Use    Smoking status: Never    Smokeless tobacco: Never   Vaping Use    Vaping status: Never Used   Substance and Sexual Activity    Alcohol use: Not Currently     Drug use: Never    Sexual activity: Not on file   Other Topics Concern    Caffeine Concern No    Exercise No    Seat Belt No    Special Diet No    Stress Concern No    Weight Concern Yes     Comment: Over weight   Social History Narrative    Not on file     Social Drivers of Health     Food Insecurity: Not on file   Transportation Needs: Not on file   Housing Stability: Low Risk  (12/22/2022)    Received from [facility], [facility]    Housing Stability     Mortgage Payment Concerns?: Not on file     Number of Places Lived in the Last Year: Not on file     Unstable Housing?: Not on file         ROS:   Positive for stated complaint: shortness of breath, chest tightness   Other systems are as noted in HPI.   All other systems reviewed and negative except as noted above.    Physical Exam:   Vital signs and nursing note reviewed.       /89   Pulse 97   Temp 98.6 °F (37 °C) (Oral)   Resp 18   LMP 01/25/2025 (Approximate)   SpO2 100%     GENERAL: well developed, no acute distress, non-toxic appearing   SKIN: good skin turgor, no obvious rashes  HEAD: normocephalic, atraumatic  EYES: sclera non-icteric bilaterally, conjunctiva clear bilaterally  EARS: canals clear bilaterally, TMs clear bilaterally  NOSE: nasal turbinates pink, normal mucosa  OROPHARYNX: MMM, pharynx clear, no exudates or swelling, uvula midline, no tongue elevation, maintaining airway and secretions  NECK: supple, no lymphadenopathy, no nuchal rigidity, no trismus, no edema, phonation normal    CARDIO: RRR, normal heart sounds   LUNGS: faintly coarse breath sounds bilaterally, no increased WOB, no wheezing   GI: abdomen soft and non-tender   EXTREMITIES: no cyanosis or edema, DACOSTA without difficulty  NEURO: no focal deficits  PSYCH: alert and oriented x3, answering questions appropriately, mood appropriate    MDM/Assessment/Plan:   Orders for this encounter:    Orders Placed This Encounter    XR CHEST PA +  LAT CHEST (SAG=[id number])     Order Specific Question:   What is the Relevant Clinical Indication / Reason for Exam?     Answer:   chest tightness     Order Specific Question:   Release to patient     Answer:   Immediate    POCT Flu Test     Order Specific Question:   Release to patient     Answer:   Immediate    Rapid SARS-CoV-2 by PCR     Order Specific Question:   Release to patient     Answer:   Immediate    ipratropium-albuterol (Duoneb) 0.5-2.5 (3) MG/3ML inhalation solution 3 mL     Order Specific Question:   Which area/hospital     Answer:   Intermittent nebulizer    predniSONE 20 MG Oral Tab     Sig: Take 2 tablets (40 mg total) by mouth daily for 5 days.     Dispense:  10 tablet     Refill:  0       Labs performed this visit:  Recent Results (from the past 10 hours)   POCT Flu Test    Collection Time: 02/19/25  4:39 PM    Specimen: Nares; Other   Result Value Ref Range    POCT INFLUENZA A Negative Negative    POCT INFLUENZA B Negative Negative   Rapid SARS-CoV-2 by PCR    Collection Time: 02/19/25  4:39 PM    Specimen: Nares; Other   Result Value Ref Range    Rapid SARS-CoV-2 by PCR Not Detected Not Detected       Imaging performed this visit:  XR CHEST PA + LAT CHEST (CPT=71046)   Final Result   PROCEDURE:  XR CHEST PA + LAT CHEST (CPT=71046)       INDICATIONS:  chest tightness       COMPARISON:  None.       TECHNIQUE:  PA and lateral chest radiographs were obtained.       PATIENT STATED HISTORY: (As transcribed by Technologist)  Patient states    she has shortness of breath, chest pain, and chest tightness for 1 day.    History of asthma.                                  =====   CONCLUSION:  Normal cardiac and mediastinal contours.  No pulmonary edema    or focal airspace consolidation.  The pleural spaces are clear.  Regional    osseous structures are normal.               LOCATION:  [facility]           Dictated by (CST): Frank Khan MD on 2/19/2025 at 5:01 PM        Finalized by (CST): Frank Khan MD on  2/19/2025 at 5:03 PM             Medical Decision Making  DDx includes asthma exacerbation versus URI versus bronchitis versus other.  Patient is overall well-appearing with stable vitals and tolerating oral intake.  No hypoxia or signs of respiratory distress.  No chest pain. No cardiac risk factors. PERC score 0. Wells score 0.     Covid and flu tests negative. CXR reviewed, no evidence of acute cardiopulmonary process.     Patient given DuoNeb breathing treatment in IC. On reassessment, patient states she is feeling much better and notes significant improvement in breathing following treatment. Vitals remain stable. No new/worsening symptoms throughout IC stay. Discussed results with patient who was reassured by these findings. Offered further evaluation including EKG and labs however given improvement in symptoms following DuoNeb, suspect symptoms are related to patient's asthma. Patient declines further work up and states she feels comfortable going home. Rx prednisone x 5 days. Patient has inhaler. Discussed supportive care including rest, increased fluid intake, OTC Tylenol/Motrin as needed for pain, and using a humidifier. Instructed patient go to directly to nearest ER with any worsening or concerning symptoms. Follow up with PCP. Work note provided.     Amount and/or Complexity of Data Reviewed  Labs: ordered.  Radiology: ordered and independent interpretation performed.    Risk  OTC drugs.  Prescription drug management.        Diagnosis:    ICD-10-CM    1. Exacerbation of asthma, unspecified asthma severity, unspecified whether persistent ([facility])  J45.901           All results reviewed and discussed with patient/patient's family. Patient/patient's family verbalize excellent understanding of instructions and feels comfortable with plan. All of patient's/patient's family's questions were addressed.   See AVS for detailed discharge instructions for your condition today.    Follow Up with:  Reymundo Best,  DO  [address] [place] [id number]  [phone]    Schedule an appointment as soon as possible for a visit         Note: This document was dictated using Dragon medical dictation software.  Proofreading was performed to the best of my ability, but errors may be present.    Deepika De La Fuente PA-C

## 2025-02-19 NOTE — DISCHARGE INSTRUCTIONS
Complete entire course of prednisone (steroid) as directed  Use your inhaler as directed    Alternate Tylenol and Motrin every 3 hours for pain or fever > 100.4 degrees  Drink plenty of fluids   Get plenty of rest     You may benefit from taking a decongestant (e.g. Sudafed)  You may benefit from taking a daily allergy medication (e.g. Zyrtec)  You may benefit from using a humidifier    Sleep with head elevated and avoid laying flat  Avoid having air blow on your face    Wash hands often  Disinfect your environment  Do not share utensils or drinks    Follow up with your primary care provider

## 2025-02-20 RX ORDER — ALBUTEROL SULFATE 90 UG/1
1 INHALANT RESPIRATORY (INHALATION) EVERY 4 HOURS PRN
Qty: 25.5 G | Refills: 3 | Status: SHIPPED | OUTPATIENT
Start: 2025-02-20

## 2025-02-20 RX ORDER — EPINEPHRINE 0.3 MG/.3ML
SOLUTION INTRAMUSCULAR; SUBCUTANEOUS
Qty: 1 EACH | Refills: 0 | Status: SHIPPED | OUTPATIENT
Start: 2025-02-20

## 2025-02-20 RX ORDER — FLUTICASONE PROPIONATE AND SALMETEROL 250; 50 UG/1; UG/1
1 POWDER RESPIRATORY (INHALATION) 2 TIMES DAILY
Qty: 180 EACH | Refills: 3 | Status: SHIPPED | OUTPATIENT
Start: 2025-02-20

## 2025-02-24 ENCOUNTER — PATIENT MESSAGE (OUTPATIENT)
Dept: FAMILY MEDICINE CLINIC | Facility: CLINIC | Age: 35
End: 2025-02-24

## 2025-02-24 NOTE — TELEPHONE ENCOUNTER
Denied    Note from payer: CaseId:[id number];Status:Denied;Review Type:Prior Auth;Appeal Information: Attention:ATTN:  CLINICAL APPEALS DEPARTMENT [employer] [address] [address],[place], [place],[address] Phone:[phone] Fax:[phone]; Important - Please read the below note on eAppeals: Please reference the denial letter for information on the rights for an appeal, rationale for the denial, and how to submit an appeal including if any information is needed to support the appeal. Note about urgent situations - Generally, an urgent situation is one which, in the opinion of the provider, the health of the patient may be in serious jeopardy or may experience pain that cannot be adequately controlled while waiting for a decision on the appeal.;  Payer: [employer] Case ID: [id number]    [phone]  Electronic appeal: Supported  View History

## 2025-02-24 NOTE — TELEPHONE ENCOUNTER
Wegovy denied, patient without baseline BMI of greater than or equal to 40 OR greater than or equal to 35 with at least 2 weight related comorbidities:

## 2025-02-25 ENCOUNTER — TELEPHONE (OUTPATIENT)
Dept: FAMILY MEDICINE CLINIC | Facility: CLINIC | Age: 35
End: 2025-02-25

## 2025-02-25 DIAGNOSIS — Z91.018 FOOD ALLERGY: Primary | ICD-10-CM

## 2025-02-25 NOTE — TELEPHONE ENCOUNTER
Can you please check with the patient and make sure that this patient has not had a bad experience with the EpiPen in the past?  If she has not then I will write for the EpiPen as the alternative.

## 2025-02-25 NOTE — TELEPHONE ENCOUNTER
Dulce Maria calling from [facility]     Dulce Maria states the medication Symjepiu is no longer manufactured     Dulce Maria advises to order under Ephinephrine OR epi pen     Please advise and thank you.  Please reply to david: EM RN TRIAGE

## 2025-02-26 ENCOUNTER — TELEPHONE (OUTPATIENT)
Dept: FAMILY MEDICINE CLINIC | Facility: CLINIC | Age: 35
End: 2025-02-26

## 2025-02-26 RX ORDER — EPINEPHRINE 0.3 MG/.3ML
0.3 INJECTION SUBCUTANEOUS AS NEEDED
Qty: 2 EACH | Refills: 2 | Status: SHIPPED | OUTPATIENT
Start: 2025-02-26 | End: 2025-05-27

## 2025-02-26 NOTE — TELEPHONE ENCOUNTER
This medication was denied because it does not meet criteria.    Acitretin Pregnancy And Lactation Text: This medication is Pregnancy Category X and should not be given to women who are pregnant or may become pregnant in the future. This medication is excreted in breast milk.

## 2025-02-26 NOTE — TELEPHONE ENCOUNTER
Prescription for EpiPen has been sent to the pharmacy.  Please call the patient and let her know.

## 2025-03-04 ENCOUNTER — PATIENT MESSAGE (OUTPATIENT)
Dept: FAMILY MEDICINE CLINIC | Facility: CLINIC | Age: 35
End: 2025-03-04

## 2025-03-05 ENCOUNTER — PATIENT MESSAGE (OUTPATIENT)
Dept: FAMILY MEDICINE CLINIC | Facility: CLINIC | Age: 35
End: 2025-03-05

## 2025-03-06 NOTE — TELEPHONE ENCOUNTER
See previous encounter. This was already addressed.  Medication is not covered because she does not meet the criteria.

## 2025-03-10 NOTE — TELEPHONE ENCOUNTER
PA for Zepbound denied. Patient calling and reports below:    Patient is willing to pay out of pocket and has been doing so research on this.   She found a pharmacy that will do compound medication and asking if MD will send RX for this.     RX should states compound Trizepatide 2.5mg Vial and include the directions for use.   Address of pharmacy she is requesting this to go to is below if MD agreeable to doing compounded med.   Please advise.     [facility] - [address] [place] [address]   [phone]   Compound Trizepatide 2.5  - Vial with syringes     $300 one month supply   If dose goes up, may be $399 a month but patient will to pay for this.

## 2025-03-11 ENCOUNTER — HOSPITAL ENCOUNTER (OUTPATIENT)
Dept: ULTRASOUND IMAGING | Age: 35
Discharge: HOME | End: 2025-03-11
Attending: FAMILY MEDICINE
Payer: COMMERCIAL

## 2025-03-11 DIAGNOSIS — R79.89 ELEVATED LFTS: ICD-10-CM

## 2025-03-11 DIAGNOSIS — E78.5 HYPERLIPIDEMIA, UNSPECIFIED HYPERLIPIDEMIA TYPE: ICD-10-CM

## 2025-03-11 PROCEDURE — 76705 ECHO EXAM OF ABDOMEN: CPT | Performed by: FAMILY MEDICINE

## 2025-03-16 ENCOUNTER — PATIENT MESSAGE (OUTPATIENT)
Dept: FAMILY MEDICINE CLINIC | Facility: CLINIC | Age: 35
End: 2025-03-16

## 2025-03-17 ENCOUNTER — TELEPHONE (OUTPATIENT)
Dept: FAMILY MEDICINE CLINIC | Facility: CLINIC | Age: 35
End: 2025-03-17

## 2025-03-17 NOTE — TELEPHONE ENCOUNTER
Called the ultrasound dept regarding the ultrasound of the liver     Spoke with Dona in the ultrasound dept and she states that a radiologist has the test in his que and will most likely read the result today.     Patient was informed and updated on when results will appear in mychart. Verbalized understanding.

## 2025-03-19 ENCOUNTER — TELEPHONE (OUTPATIENT)
Dept: FAMILY MEDICINE CLINIC | Facility: CLINIC | Age: 35
End: 2025-03-19

## 2025-03-19 DIAGNOSIS — R79.89 ELEVATED LFTS: Primary | ICD-10-CM

## 2025-03-19 DIAGNOSIS — K76.0 FATTY LIVER: ICD-10-CM

## 2025-03-19 NOTE — TELEPHONE ENCOUNTER
Patient called stated that test results states she had fatty liver and wants to do a new prior auth of Zepbound.    If a re authorization will be suggested for Zepbound with new diagnosis she will need a televisit first before a prior auth can be done.    Please advise

## 2025-03-20 NOTE — TELEPHONE ENCOUNTER
Dr. Best please see below.  Patient is also requesting a fibrosis scan for her fatty liver. Order pended for signature if appropriate.

## 2025-03-21 ENCOUNTER — TELEPHONE (OUTPATIENT)
Dept: FAMILY MEDICINE CLINIC | Facility: CLINIC | Age: 35
End: 2025-03-21

## 2025-03-21 DIAGNOSIS — E66.812 CLASS 2 SEVERE OBESITY WITH SERIOUS COMORBIDITY AND BODY MASS INDEX (BMI) OF 35.0 TO 35.9 IN ADULT, UNSPECIFIED OBESITY TYPE (HCC): ICD-10-CM

## 2025-03-21 DIAGNOSIS — K76.0 FATTY LIVER: Primary | ICD-10-CM

## 2025-03-21 DIAGNOSIS — E66.01 CLASS 2 SEVERE OBESITY WITH SERIOUS COMORBIDITY AND BODY MASS INDEX (BMI) OF 35.0 TO 35.9 IN ADULT, UNSPECIFIED OBESITY TYPE (HCC): ICD-10-CM

## 2025-03-21 NOTE — TELEPHONE ENCOUNTER
Regarding the semaglutide injection therapy for weight loss, she does not need to be seen again if we have already tried to fill the prescription that her insurance will not cover. Please just cue up the medication that she is trying to get and I will sign it.

## 2025-03-21 NOTE — TELEPHONE ENCOUNTER
Dr. Best - this has already been routed to you. Patient clarified: her insurance will cover the Zepbound with both issues listed, obesity and fatty liver.     Patient wants to try Zepbound prescription and prior authorization again.     RN spoke with patient for separate encounter about requested fibrosis scan.    Patient stated she has called her insurance [employer], and she was told Wegovy, Zepbound, or Saxenda would be covered, but documentation of comorbidity is needed in addition to weight loss in order for insurance to approve.

## 2025-03-21 NOTE — TELEPHONE ENCOUNTER
Who is requesting this liver scan?  Is it the patient or has she seen a liver specialist.  I did review the chart and I do not see any evidence of the patient seeing a liver specialist.  You can offer her a referral to see a liver specialist and if the specialist sees fit to do this particular scan, then that scan will be ordered.    Regarding the semaglutide injection therapy for weight loss, she does not need to be seen again if we have already tried to fill the prescription that her insurance will not cover.  Please just cue up the medication that she is trying to get and I will sign it.

## 2025-03-21 NOTE — TELEPHONE ENCOUNTER
Patient called, she is wants to get prior auth for Zepbound given imaging shows fatty liver which can increase risk of progression to liver disease and for which weight loss is the primary treatment.  Dr Best, please advise on addending note and Triage Support, please resubmit prior auth.

## 2025-03-21 NOTE — TELEPHONE ENCOUNTER
Dr. Best, please addend office notes to add new diagnosis of fatty liver and route back to triage support

## 2025-03-21 NOTE — TELEPHONE ENCOUNTER
Patient calling back to discuss with RN about results. Transferred.    Already discussed a re authorization with patient on [facility] [employer]

## 2025-03-21 NOTE — TELEPHONE ENCOUNTER
Dr. Best - patient has not seen a liver specialist. Patient is requesting the fibrosis scan for her own peace of mind. Please advise.     There are two encounters open for this patient. The other encounter is about the GLP-1.   See documentation under encounter 3/21/25 \"prior authorization\".     Spoke to patient, full name and date of birth verified.   Patient works for [employer]. When patient got her ultrasound results, she discussed with Dr. Chavez, who is her gastroenterologist - they work together.     Dr. Chavez recommended diet and exercise for this, she did not recommend the fibrosis scan.     Patient has not seen any other specialist for the fatty liver.     Patient stated she did her own research about fatty liver.   Patient is requesting the fibrosis scan order for her own peace of mind because the ultrasound of the liver does not give good enough images as how thick her liver is.

## 2025-03-22 NOTE — TELEPHONE ENCOUNTER
The patient has an isolated elevated enzyme referred to his alkaline phosphatase.  This enzyme is not exclusive to the function of the liver.  There are several liver function tests in that same chemistry panel that are normal.  Patient does not have a history for alcohol use according to the history she gave us on the chart.  There is no indication for this type of a liver ultrasound, therefore I can offer her a referral to the liver specialist or we can have her to repeat her liver function tests.  Advised the patient to hydrate well for the repeat test and the order is on the chart for liver function test panel.  Thank you.

## 2025-03-23 ENCOUNTER — LAB ENCOUNTER (OUTPATIENT)
Dept: LAB | Facility: HOSPITAL | Age: 35
End: 2025-03-23
Attending: FAMILY MEDICINE
Payer: COMMERCIAL

## 2025-03-23 DIAGNOSIS — R79.89 ELEVATED LFTS: ICD-10-CM

## 2025-03-23 DIAGNOSIS — K76.0 FATTY LIVER: ICD-10-CM

## 2025-03-23 LAB
ALBUMIN SERPL-MCNC: 4.7 G/DL (ref 3.2–4.8)
ALP LIVER SERPL-CCNC: 97 U/L
ALT SERPL-CCNC: 27 U/L
AST SERPL-CCNC: 19 U/L (ref ?–34)
BILIRUB DIRECT SERPL-MCNC: 0.2 MG/DL (ref ?–0.3)
BILIRUB SERPL-MCNC: 0.6 MG/DL (ref 0.3–1.2)
HAV AB SER QL IA: NONREACTIVE
HBV CORE AB SERPL QL IA: NONREACTIVE
HBV SURFACE AB SER QL: NONREACTIVE
HBV SURFACE AB SERPL IA-ACNC: 3.8 MIU/ML
HBV SURFACE AG SERPL QL IA: NONREACTIVE
HCV AB SERPL QL IA: NONREACTIVE
PROT SERPL-MCNC: 7.5 G/DL (ref 5.7–8.2)

## 2025-03-23 PROCEDURE — 87340 HEPATITIS B SURFACE AG IA: CPT

## 2025-03-23 PROCEDURE — 80503 PATH CLIN CONSLTJ SF 5-20: CPT

## 2025-03-23 PROCEDURE — 36415 COLL VENOUS BLD VENIPUNCTURE: CPT

## 2025-03-23 PROCEDURE — 80076 HEPATIC FUNCTION PANEL: CPT

## 2025-03-23 PROCEDURE — 86803 HEPATITIS C AB TEST: CPT

## 2025-03-23 PROCEDURE — 86704 HEP B CORE ANTIBODY TOTAL: CPT

## 2025-03-23 PROCEDURE — 86706 HEP B SURFACE ANTIBODY: CPT

## 2025-03-23 PROCEDURE — 86708 HEPATITIS A ANTIBODY: CPT

## 2025-03-24 RX ORDER — TIRZEPATIDE 2.5 MG/.5ML
2.5 INJECTION, SOLUTION SUBCUTANEOUS WEEKLY
Qty: 2 ML | Refills: 0 | Status: SHIPPED | OUTPATIENT
Start: 2025-03-24 | End: 2025-04-15

## 2025-03-24 NOTE — TELEPHONE ENCOUNTER
Verified name and .    Patient was advised of Dr. Best's message.     Patient verbalizes understanding and agrees with plan- states that she is already seeing liver specialist and that she has already viewed results from hepatic function panel with result notes from Dr. Best.

## 2025-03-25 ENCOUNTER — MED REC SCAN ONLY (OUTPATIENT)
Dept: FAMILY MEDICINE CLINIC | Facility: CLINIC | Age: 35
End: 2025-03-25

## 2025-03-25 NOTE — TELEPHONE ENCOUNTER
Called [employer] at [phone] and appeal completed over the phone.   Deadline for decision is 4/9/25, same case # [id number].

## 2025-04-04 NOTE — TELEPHONE ENCOUNTER
Called plan and appeal is still pending with deadline of 4/9/24.  Fax with decision will be sent once completed.

## 2025-04-04 NOTE — TELEPHONE ENCOUNTER
Called for status of appeal, over 15 min wait.   Will have to call back, deadline for outcome is 4/9.

## 2025-04-08 ENCOUNTER — HOSPITAL ENCOUNTER (OUTPATIENT)
Age: 35
Discharge: HOME | End: 2025-04-08
Payer: COMMERCIAL

## 2025-04-08 VITALS
RESPIRATION RATE: 18 BRPM | OXYGEN SATURATION: 99 % | HEART RATE: 96 BPM | SYSTOLIC BLOOD PRESSURE: 144 MMHG | WEIGHT: 206 LBS | TEMPERATURE: 97 F | BODY MASS INDEX: 35.17 KG/M2 | DIASTOLIC BLOOD PRESSURE: 98 MMHG | HEIGHT: 64 IN

## 2025-04-08 DIAGNOSIS — T50.905A ADVERSE EFFECT OF DRUG, INITIAL ENCOUNTER: Primary | ICD-10-CM

## 2025-04-08 DIAGNOSIS — K52.1 DIARRHEA DUE TO DRUG: ICD-10-CM

## 2025-04-08 LAB — S PYO AG THROAT QL: NEGATIVE

## 2025-04-08 PROCEDURE — 87880 STREP A ASSAY W/OPTIC: CPT | Performed by: PHYSICIAN ASSISTANT

## 2025-04-08 PROCEDURE — 99213 OFFICE O/P EST LOW 20 MIN: CPT | Performed by: PHYSICIAN ASSISTANT

## 2025-04-08 RX ORDER — DIPHENHYDRAMINE HCL 25 MG
25 CAPSULE ORAL ONCE
Status: COMPLETED | OUTPATIENT
Start: 2025-04-08 | End: 2025-04-08

## 2025-04-08 NOTE — ED INITIAL ASSESSMENT (HPI)
Pt c/o bilateral jaw pain,  pt also c/o throat  sensation of something  in her throat. Pt denies difficulty breathing.  Pt also c/o diarrhea stools, pt did take magnesium citrate.  Pt concerned she may be having an allergic reaction to a new medication she started on Thursday.

## 2025-04-08 NOTE — ED PROVIDER NOTES
Patient Seen in: [facility]      History     Chief Complaint   Patient presents with    Jaw Pain     Stated Complaint: Allergic reaction to medication, jaw pain, throat tightness    Subjective:   HPI  Haider Sanders is a 34 year old female  presents with multiple non specific symptoms that started over 72 hours prior to arrival.  Symptoms include throat pain, diarrhea, with concern for allergic reaction.   Patient received first dose of Zepbound prior to onset of symptoms. Shortly thereafter patient experienced abdominal fullness and drank a bottle of magnesium citrate. Patient reports tolerating po intake.  Last po intake was less than 12 hours prior.   Last BM was approximately 2 hours prior and described as loose in nature.  Patient denies dysphagia to both solids and liquids, ear pain, rhinorrhea, fevers, chills, shortness of breath, respiratory distress, stridor, neck pain/ stiffness, headache, eye pain/ redness, facial/ lip/ eyelid swelling. No medications taken prior to arrival. No alleviating/ aggravating factors. Pain 4/10          Objective:     Past Medical History:    Allergic rhinitis    Allergies    Asthma (HCC)    Esophageal reflux    Prediabetes              Past Surgical History:   Procedure Laterality Date    Colonoscopy N/A 2024    Procedure: COLONOSCOPY with polypectomy;  Surgeon: Michelle Chavez MD;  Location: [facility] ENDOSCOPY    [facility]                  Social History     Socioeconomic History    Marital status: Single   Tobacco Use    Smoking status: Never    Smokeless tobacco: Never   Vaping Use    Vaping status: Never Used   Substance and Sexual Activity    Alcohol use: Not Currently    Drug use: Never   Other Topics Concern    Caffeine Concern No    Exercise No    Seat Belt No    Special Diet No    Stress Concern No    Weight Concern Yes     Comment: Over weight     Social Drivers of Health      Received from [facility], [facility]    Housing Stability              Review of Systems   All other systems reviewed and are negative.      Positive for stated complaint: Allergic reaction to medication, jaw pain, throat tightness  Other systems are as noted in HPI.  Constitutional and vital signs reviewed.      All other systems reviewed and negative except as noted above.    Physical Exam     ED Triage Vitals [04/08/25 0952]   BP (!) 144/98   Pulse 96   Resp 18   Temp 97.4 °F (36.3 °C)   Temp src Oral   SpO2 99 %   O2 Device None (Room air)       Current Vitals:   Vital Signs  BP: (!) 144/98  Pulse: 96  Resp: 18  Temp: 97.4 °F (36.3 °C)  Temp src: Oral    Oxygen Therapy  SpO2: 99 %  O2 Device: None (Room air)        Physical Exam  Vitals and nursing note reviewed.   Constitutional:       General: She is not in acute distress.     Appearance: Normal appearance. She is normal weight. She is not ill-appearing, toxic-appearing or diaphoretic.   HENT:      Head: Normocephalic and atraumatic.      Right Ear: Tympanic membrane, ear canal and external ear normal. There is no impacted cerumen.      Left Ear: Tympanic membrane, ear canal and external ear normal. There is no impacted cerumen.      Nose: Congestion present. No rhinorrhea.      Mouth/Throat:      Mouth: Mucous membranes are moist.      Pharynx: Oropharynx is clear. No oropharyngeal exudate or posterior oropharyngeal erythema.   Eyes:      Extraocular Movements: Extraocular movements intact.      Conjunctiva/sclera: Conjunctivae normal.      Pupils: Pupils are equal, round, and reactive to light.   Cardiovascular:      Rate and Rhythm: Normal rate.   Pulmonary:      Effort: Pulmonary effort is normal. No respiratory distress.      Breath sounds: No stridor. No wheezing, rhonchi or rales.   Chest:      Chest wall: No tenderness.   Abdominal:      General: Abdomen is flat. Bowel sounds are normal. There is no distension.      Palpations: There is no mass.      Tenderness: There is no  abdominal tenderness. There is no right CVA tenderness, left CVA tenderness, guarding or rebound.      Hernia: No hernia is present.   Musculoskeletal:         General: No swelling, tenderness, deformity or signs of injury. Normal range of motion.      Cervical back: Normal range of motion and neck supple.   Skin:     General: Skin is warm and dry.      Capillary Refill: Capillary refill takes less than 2 seconds.      Coloration: Skin is not jaundiced or pale.      Findings: No bruising, erythema, lesion or rash.   Neurological:      General: No focal deficit present.      Mental Status: She is alert and oriented to person, place, and time. Mental status is at baseline.   Psychiatric:         Mood and Affect: Mood normal.         Behavior: Behavior normal.             ED Course     Labs Reviewed   POCT RAPID STREP - Normal                   MDM             Medical Decision Making  34 year old well appearing female who recently started Zepbound presents with acute onset of subjective throat pain and diarrhea that started after drinking magnesium citrate.   Throat pain- considerations include but not limited to strep pharyngitis vs anaphylaxis vs PTA.  Diarrhea- considerations viral gastroenteritis vs laxative use vs infectious diarrhea  Plan   - strep swab   - bendaryl 25mg po now   - DC to home   - refer to PCP  - encourage oral fluid rehydration      Amount and/or Complexity of Data Reviewed  Labs: ordered. Decision-making details documented in ED Course.     Details: Strep swab- negative         Disposition and Plan     Clinical Impression:  1. Adverse effect of drug, initial encounter    2. Diarrhea due to drug         Disposition:  Discharge  4/8/2025 10:27 am    Follow-up:  Reymundo Best,   [address] [place] [address]  [facility] [address]  [phone]          [address] [address]  [phone]              Medications Prescribed:  Discharge  Medication List as of 4/8/2025 10:33 AM              Supplementary Documentation:

## 2025-04-19 ENCOUNTER — HOSPITAL ENCOUNTER (EMERGENCY)
Facility: HOSPITAL | Age: 35
Discharge: HOME | End: 2025-04-19
Attending: EMERGENCY MEDICINE
Payer: COMMERCIAL

## 2025-04-19 VITALS
HEART RATE: 86 BPM | HEIGHT: 64 IN | TEMPERATURE: 98 F | BODY MASS INDEX: 35.34 KG/M2 | DIASTOLIC BLOOD PRESSURE: 84 MMHG | SYSTOLIC BLOOD PRESSURE: 124 MMHG | RESPIRATION RATE: 20 BRPM | OXYGEN SATURATION: 98 % | WEIGHT: 207 LBS

## 2025-04-19 DIAGNOSIS — H65.02 NON-RECURRENT ACUTE SEROUS OTITIS MEDIA OF LEFT EAR: ICD-10-CM

## 2025-04-19 DIAGNOSIS — S16.1XXA STRAIN OF STERNOCLEIDOMASTOID MUSCLE, INITIAL ENCOUNTER: Primary | ICD-10-CM

## 2025-04-19 PROCEDURE — 99283 EMERGENCY DEPT VISIT LOW MDM: CPT

## 2025-04-19 RX ORDER — FLUTICASONE PROPIONATE 50 MCG
2 SPRAY, SUSPENSION (ML) NASAL DAILY
Qty: 16 G | Refills: 0 | Status: SHIPPED | OUTPATIENT
Start: 2025-04-19 | End: 2025-05-19

## 2025-04-19 RX ORDER — NAPROXEN 500 MG/1
500 TABLET ORAL 2 TIMES DAILY PRN
Qty: 20 TABLET | Refills: 0 | Status: SHIPPED | OUTPATIENT
Start: 2025-04-19 | End: 2025-04-29

## 2025-04-19 NOTE — ED INITIAL ASSESSMENT (HPI)
To the ED with c/o left sided neck pain after she was doing a lymphatic massage last noc. States pain continues into today. Took OTC pain meds with little to relief. States pain radiates up towards her left ear as well.

## 2025-04-20 NOTE — ED PROVIDER NOTES
Patient Seen in: [facility] Emergency Department      History     Chief Complaint   Patient presents with    Pain     Stated Complaint: pain neck and left ear    Subjective:   HPI    34-year-old female presents for evaluation of left sided neck and left ear pain.  Patient states he was massaging her face and neck yesterday when she felt some pain in the lateral aspect of her neck.  She states she took some Excedrin for it with improvement.  She also notes some discomfort going into her left ear.  Denies any drainage.  Denies cough or cold symptoms.  Denies sore throat.  Denies any difficulty swallowing or breathing.  Denies headache.  Denies fever or chills.    History of Present Illness               Objective:     Past Medical History:    Allergic rhinitis    Allergies    Asthma (HCC)    Esophageal reflux    Prediabetes              Past Surgical History:   Procedure Laterality Date    Colonoscopy N/A 2024    Procedure: COLONOSCOPY with polypectomy;  Surgeon: Michelle Chavez MD;  Location: [facility] ENDOSCOPY                      Social History     Socioeconomic History    Marital status: Single   Tobacco Use    Smoking status: Never    Smokeless tobacco: Never   Vaping Use    Vaping status: Never Used   Substance and Sexual Activity    Alcohol use: Not Currently    Drug use: Never   Other Topics Concern    Caffeine Concern No    Exercise No    Seat Belt No    Special Diet No    Stress Concern No    Weight Concern Yes     Comment: Over weight     Social Drivers of Health      Received from [facility]    Housing Stability                                Physical Exam     ED Triage Vitals [25 1852]   BP (!) 149/104   Pulse 114   Resp 22   Temp 97.7 °F (36.5 °C)   Temp src Oral   SpO2 98 %   O2 Device None (Room air)       Current Vitals:   Vital Signs  BP: (!) 149/104  Pulse: 114  Resp: 22  Temp: 97.7 °F (36.5 °C)  Temp src: Oral    Oxygen Therapy  SpO2: 98 %  O2 Device: None (Room  air)        Physical Exam  Vitals and nursing note reviewed.   Constitutional:       Appearance: She is well-developed.   HENT:      Head: Normocephalic and atraumatic.      Ears:      Comments: Left TM with slight dullness  No erythema     Mouth/Throat:      Mouth: Mucous membranes are moist.      Pharynx: No posterior oropharyngeal erythema.   Eyes:      General: No scleral icterus.     Conjunctiva/sclera: Conjunctivae normal.   Neck:      Comments: Mild tenderness over the right sternocleidomastoid muscle  No lymphadenopathy  Cardiovascular:      Rate and Rhythm: Normal rate and regular rhythm.   Musculoskeletal:      Cervical back: Neck supple. No rigidity.   Skin:     General: Skin is warm and dry.   Neurological:      General: No focal deficit present.      Mental Status: She is alert and oriented to person, place, and time.      Cranial Nerves: No cranial nerve deficit.      Motor: No weakness.   Psychiatric:         Mood and Affect: Mood normal.         Behavior: Behavior normal.           Physical Exam                ED Course   Labs Reviewed - No data to display       Results                                 MDM        34-year-old female presents for evaluation of left sided neck and left ear pain.      Differential includes but is not limited to serous otitis media, muscular neck pain    Exam is benign with mild tenderness over the left SCM.  No further diagnostic testing indicated at this time.  TM with some dullness but no evidence of otitis media.  Patient states she has been a little congested as well.  Will DC home with Rx for Flonase along with naproxen and Zanaflex for muscular neck pain.  Advise close follow-up with PCP.  Return precautions discussed      Medical Decision Making  Risk  Prescription drug management.        Disposition and Plan     Clinical Impression:  1. Strain of sternocleidomastoid muscle, initial encounter    2. Non-recurrent acute serous otitis media of left ear          Disposition:  Discharge  4/19/2025  9:17 pm    Follow-up:  Reymundo Best, DO  [address] [address]  [phone]    Schedule an appointment as soon as possible for a visit in 3 day(s)            Medications Prescribed:  Current Discharge Medication List        START taking these medications    Details   naproxen 500 MG Oral Tab Take 1 tablet (500 mg total) by mouth 2 (two) times daily as needed.  Qty: 20 tablet, Refills: 0      tiZANidine 4 MG Oral Tab Take 1 tablet (4 mg total) by mouth every 8 (eight) hours as needed.  Qty: 20 tablet, Refills: 0      fluticasone propionate 50 MCG/ACT Nasal Suspension 2 sprays by Nasal route daily.  Qty: 16 g, Refills: 0             Supplementary Documentation:

## 2025-04-29 ENCOUNTER — PATIENT MESSAGE (OUTPATIENT)
Dept: FAMILY MEDICINE CLINIC | Facility: CLINIC | Age: 35
End: 2025-04-29

## 2025-04-30 RX ORDER — TIRZEPATIDE 5 MG/.5ML
5 INJECTION, SOLUTION SUBCUTANEOUS WEEKLY
Qty: 2 ML | Refills: 0 | Status: SHIPPED | OUTPATIENT
Start: 2025-04-30 | End: 2025-05-22

## 2025-04-30 NOTE — TELEPHONE ENCOUNTER
Which GLP is the patient on: Zepbound   Current dose: 2.5mg   Patient seeking refill or increase to next dose: Increase   How many doses of current dose completed: 8  Side effects, if any: none   Current weight / how much weight loss: 7 lbs   Last visit: 2/14/25    Pended for review.

## 2025-05-11 DIAGNOSIS — E55.9 VITAMIN D INSUFFICIENCY: ICD-10-CM

## 2025-05-12 RX ORDER — ERGOCALCIFEROL 1.25 MG/1
50000 CAPSULE, LIQUID FILLED ORAL WEEKLY
Qty: 12 CAPSULE | Refills: 0 | Status: SHIPPED | OUTPATIENT
Start: 2025-05-12 | End: 2025-08-04

## 2025-05-27 RX ORDER — TIRZEPATIDE 5 MG/.5ML
5 INJECTION, SOLUTION SUBCUTANEOUS WEEKLY
Qty: 2 ML | Refills: 0 | Status: SHIPPED | OUTPATIENT
Start: 2025-05-27 | End: 2025-06-18

## 2025-05-27 NOTE — TELEPHONE ENCOUNTER
Please review. Protocol Failed; No Protocol    Please see patients MyChart response to GLP:    Please review and advise    Hi everything is going well so far a little nausea the day after the shot but other than that I haven’t had any problems at all the medication is working well for right now I would like to stick to 5mg because I’m seeing great progress right now I’m down about 12 pounds as of right now

## 2025-05-31 ENCOUNTER — HOSPITAL ENCOUNTER (EMERGENCY)
Facility: HOSPITAL | Age: 35
Discharge: HOME | End: 2025-06-01
Attending: EMERGENCY MEDICINE
Payer: COMMERCIAL

## 2025-05-31 ENCOUNTER — APPOINTMENT (OUTPATIENT)
Dept: CT IMAGING | Facility: HOSPITAL | Age: 35
End: 2025-05-31
Attending: EMERGENCY MEDICINE
Payer: COMMERCIAL

## 2025-05-31 VITALS
RESPIRATION RATE: 18 BRPM | HEART RATE: 84 BPM | DIASTOLIC BLOOD PRESSURE: 89 MMHG | SYSTOLIC BLOOD PRESSURE: 134 MMHG | BODY MASS INDEX: 33.97 KG/M2 | OXYGEN SATURATION: 100 % | WEIGHT: 199 LBS | TEMPERATURE: 98 F | HEIGHT: 64 IN

## 2025-05-31 DIAGNOSIS — G44.209 TENSION HEADACHE: ICD-10-CM

## 2025-05-31 DIAGNOSIS — R10.32 LLQ PAIN: Primary | ICD-10-CM

## 2025-05-31 DIAGNOSIS — K63.89 EPIPLOIC APPENDAGITIS: ICD-10-CM

## 2025-05-31 LAB
ALBUMIN SERPL-MCNC: 4.9 G/DL (ref 3.2–4.8)
ALBUMIN/GLOB SERPL: 1.6 {RATIO} (ref 1–2)
ALP LIVER SERPL-CCNC: 98 U/L (ref 37–98)
ALT SERPL-CCNC: 18 U/L (ref 10–49)
ANION GAP SERPL CALC-SCNC: 10 MMOL/L (ref 0–18)
AST SERPL-CCNC: 18 U/L (ref ?–34)
B-HCG UR QL: NEGATIVE
BASOPHILS # BLD AUTO: 0.04 X10(3) UL (ref 0–0.2)
BASOPHILS NFR BLD AUTO: 0.5 %
BILIRUB SERPL-MCNC: 0.3 MG/DL (ref 0.3–1.2)
BILIRUB UR QL STRIP.AUTO: NEGATIVE
BUN BLD-MCNC: 9 MG/DL (ref 9–23)
CALCIUM BLD-MCNC: 9.8 MG/DL (ref 8.7–10.6)
CHLORIDE SERPL-SCNC: 104 MMOL/L (ref 98–112)
CLARITY UR REFRACT.AUTO: CLEAR
CO2 SERPL-SCNC: 26 MMOL/L (ref 21–32)
COLOR UR AUTO: COLORLESS
CREAT BLD-MCNC: 0.95 MG/DL (ref 0.55–1.02)
EGFRCR SERPLBLD CKD-EPI 2021: 80 ML/MIN/1.73M2 (ref 60–?)
EOSINOPHIL # BLD AUTO: 0.21 X10(3) UL (ref 0–0.7)
EOSINOPHIL NFR BLD AUTO: 2.8 %
ERYTHROCYTE [DISTWIDTH] IN BLOOD BY AUTOMATED COUNT: 13 %
GLOBULIN PLAS-MCNC: 3 G/DL (ref 2–3.5)
GLUCOSE BLD-MCNC: 87 MG/DL (ref 70–99)
GLUCOSE UR STRIP.AUTO-MCNC: NORMAL MG/DL
HCT VFR BLD AUTO: 40.1 % (ref 35–48)
HGB BLD-MCNC: 13 G/DL (ref 12–16)
IMM GRANULOCYTES # BLD AUTO: 0.02 X10(3) UL (ref 0–1)
IMM GRANULOCYTES NFR BLD: 0.3 %
KETONES UR STRIP.AUTO-MCNC: NEGATIVE MG/DL
LEUKOCYTE ESTERASE UR QL STRIP.AUTO: NEGATIVE
LIPASE SERPL-CCNC: 46 U/L (ref 12–53)
LYMPHOCYTES # BLD AUTO: 3.45 X10(3) UL (ref 1–4)
LYMPHOCYTES NFR BLD AUTO: 46.4 %
MCH RBC QN AUTO: 25.3 PG (ref 26–34)
MCHC RBC AUTO-ENTMCNC: 32.4 G/DL (ref 31–37)
MCV RBC AUTO: 78.2 FL (ref 80–100)
MONOCYTES # BLD AUTO: 0.46 X10(3) UL (ref 0.1–1)
MONOCYTES NFR BLD AUTO: 6.2 %
NEUTROPHILS # BLD AUTO: 3.26 X10 (3) UL (ref 1.5–7.7)
NEUTROPHILS # BLD AUTO: 3.26 X10(3) UL (ref 1.5–7.7)
NEUTROPHILS NFR BLD AUTO: 43.8 %
NITRITE UR QL STRIP.AUTO: NEGATIVE
OSMOLALITY SERPL CALC.SUM OF ELEC: 288 MOSM/KG (ref 275–295)
PH UR STRIP.AUTO: 5.5 [PH] (ref 5–8)
PLATELET # BLD AUTO: 381 10(3)UL (ref 150–450)
POTASSIUM SERPL-SCNC: 3.7 MMOL/L (ref 3.5–5.1)
PROT SERPL-MCNC: 7.9 G/DL (ref 5.7–8.2)
PROT UR STRIP.AUTO-MCNC: NEGATIVE MG/DL
RBC # BLD AUTO: 5.13 X10(6)UL (ref 3.8–5.3)
RBC UR QL AUTO: NEGATIVE
SODIUM SERPL-SCNC: 140 MMOL/L (ref 136–145)
SP GR UR STRIP.AUTO: <1.005 (ref 1–1.03)
UROBILINOGEN UR STRIP.AUTO-MCNC: NORMAL MG/DL
WBC # BLD AUTO: 7.4 X10(3) UL (ref 4–11)

## 2025-05-31 PROCEDURE — 85025 COMPLETE CBC W/AUTO DIFF WBC: CPT | Performed by: EMERGENCY MEDICINE

## 2025-05-31 PROCEDURE — 80053 COMPREHEN METABOLIC PANEL: CPT | Performed by: EMERGENCY MEDICINE

## 2025-05-31 PROCEDURE — 99284 EMERGENCY DEPT VISIT MOD MDM: CPT

## 2025-05-31 PROCEDURE — 81025 URINE PREGNANCY TEST: CPT

## 2025-05-31 PROCEDURE — 81003 URINALYSIS AUTO W/O SCOPE: CPT | Performed by: EMERGENCY MEDICINE

## 2025-05-31 PROCEDURE — 83690 ASSAY OF LIPASE: CPT | Performed by: EMERGENCY MEDICINE

## 2025-05-31 PROCEDURE — 96360 HYDRATION IV INFUSION INIT: CPT

## 2025-06-01 ENCOUNTER — APPOINTMENT (OUTPATIENT)
Dept: CT IMAGING | Facility: HOSPITAL | Age: 35
End: 2025-06-01
Attending: EMERGENCY MEDICINE
Payer: COMMERCIAL

## 2025-06-01 PROCEDURE — 74176 CT ABD & PELVIS W/O CONTRAST: CPT | Performed by: EMERGENCY MEDICINE

## 2025-06-01 NOTE — ED INITIAL ASSESSMENT (HPI)
Reports LLQ pain since Wednesday, denies n/v/d. Last bowel movement today and reports wnl. States on Thursday she developed a h/a that has been consistent. Denies visual changes. Seen at the IC today and states they checked her urine for UTI, pregnancy and STD's and all was negative.

## 2025-06-01 NOTE — ED PROVIDER NOTES
Received signout from my partner pending CT scan.    CT scan reviewed as read by vision rad radiology showing 1.7 x 1.2 x 3.1 cm rounded focus of induration along the anterolateral fat of the distal descending colon consistent with epiploic appendagitis.  Trace free fluid.  Patient will be discharged.  Partner recommended Tylenol Motrin for pain.

## 2025-06-01 NOTE — DISCHARGE INSTRUCTIONS
Drink plenty of fluids - especially low-calorie sports drinks like Gatorade.    Take 1000 mg acetaminophen (2 Tylenol tablets) and/or 600 mg ibuprofen (3 Advil tablets) every 6 hours as needed

## 2025-06-01 NOTE — ED PROVIDER NOTES
Patient Seen in: [facility] Emergency Department      History     Stated Complaint: abdominal pain, seen at  today  History of Present Illness  Haider Sanders is a 34 year old female who presents with pelvic pain and a lingering headache.    She experiences mild, persistent pelvic pain on the left side since Wednesday, affecting her ability to work. There is no fever, vomiting, or urinary symptoms.    She has a lingering headache that began on Wednesday, initially occipital and now more frontal, unrelieved by Tylenol. She denies frequent headaches or migraines. Dizziness and a general feeling of being 'off' are present.    Her menstrual period is a few days late, which she attributes to tirzepatide, a GLP-1 receptor agonist she has been taking for three weeks. She reports the medication affects her appetite and slows bodily functions.    [Note: Patient (or parent) aware that ambient AI utilized for transcribing the HPI.]  Objective:   Past Medical History:    Allergic rhinitis    Allergies    Asthma (HCC)    Esophageal reflux    Prediabetes              Past Surgical History:   Procedure Laterality Date    Colonoscopy N/A 2024    Procedure: COLONOSCOPY with polypectomy;  Surgeon: Michelle Chavez MD;  Location: [facility] ENDOSCOPY                      Social History     Socioeconomic History    Marital status: Single   Tobacco Use    Smoking status: Never    Smokeless tobacco: Never   Vaping Use    Vaping status: Never Used   Substance and Sexual Activity    Alcohol use: Not Currently    Drug use: Never   Other Topics Concern    Caffeine Concern No    Exercise No    Seat Belt No    Special Diet No    Stress Concern No    Weight Concern Yes     Comment: Over weight     Social Drivers of Health      Received from [facility]    Housing Stability              Review of Systems    Positive for stated complaint: abdominal pain, seen at  today  Other systems are as noted in  HPI.  Constitutional and vital signs reviewed.      All other systems reviewed and negative except as noted above.    Physical Exam     ED Triage Vitals [05/31/25 2214]   /87   Pulse 102   Resp 18   Temp 97.8 °F (36.6 °C)   Temp src Temporal   SpO2 100 %   O2 Device None (Room air)       Current:/89   Pulse 84   Temp 97.8 °F (36.6 °C) (Temporal)   Resp 18   Ht 162.6 cm (5' 4\")   Wt 90.3 kg   LMP 04/29/2025 (Exact Date)   SpO2 100%   BMI 34.16 kg/m²     General:  Vitals as listed.  No acute distress   HEENT: Sclerae anicteric.  Conjunctivae show no pallor.  Oropharynx clear, mucous membranes moist   Lungs: good air exchange  Abdomen: Moderate left lower quadrant tenderness.  No abdominal masses.  No peritoneal signs   Extremities: no edema, normal peripheral pulses   Neuro: Alert oriented and nonfocal      ED Course     Labs Reviewed   COMP METABOLIC PANEL (14) - Abnormal; Notable for the following components:       Result Value    Albumin 4.9 (*)     All other components within normal limits   CBC WITH DIFFERENTIAL WITH PLATELET - Abnormal; Notable for the following components:    MCV 78.2 (*)     MCH 25.3 (*)     All other components within normal limits   URINALYSIS WITH CULTURE REFLEX - Abnormal; Notable for the following components:    Urine Color Colorless (*)     Spec Gravity <1.005 (*)     All other components within normal limits   LIPASE - Normal   POCT PREGNANCY URINE - Normal     CT abd: acute appendagitis epiploica along the distal descending colon.     ED COURSE and MDM       I reviewed prior external notes including evaluation at [facility] immediate care by [facility] earlier today when she complained of left lower abdominal pain    Hydrated with normal saline  I have discussed with the patient the results of testing, differential diagnosis, and treatment plan. They expressed clear understanding of these instructions and agrees to the plan provided.    Disposition and Plan     Clinical  Impression:  1. LLQ pain    2. Tension headache    3. Epiploic appendagitis         Disposition:  Discharge  6/1/2025 12:47 am    Follow-up:  Reymundo Best, DO  [address] [place] [address] [id number] [phone]    Schedule an appointment as soon as possible for a visit in 3 day(s)  As needed        Medications Prescribed:  Discharge Medication List as of 6/1/2025 12:48 AM

## 2025-06-20 ENCOUNTER — NURSE ONLY (OUTPATIENT)
Dept: INTERNAL MEDICINE CLINIC | Facility: HOSPITAL | Age: 35
End: 2025-06-20
Attending: PREVENTIVE MEDICINE

## 2025-06-20 DIAGNOSIS — Z00.00 WELLNESS EXAMINATION: Primary | ICD-10-CM

## 2025-06-20 PROCEDURE — 86480 TB TEST CELL IMMUN MEASURE: CPT

## 2025-06-23 LAB
M TB IFN-G CD4+ T-CELLS BLD-ACNC: 0.07 IU/ML
M TB TUBERC IFN-G BLD QL: NEGATIVE
M TB TUBERC IGNF/MITOGEN IGNF CONTROL: >10 IU/ML
QFT TB1 AG MINUS NIL: 0.01 IU/ML
QFT TB2 AG MINUS NIL: 0 IU/ML

## 2025-06-30 ENCOUNTER — HOSPITAL ENCOUNTER (EMERGENCY)
Facility: HOSPITAL | Age: 35
Discharge: HOME | End: 2025-06-30
Attending: STUDENT IN AN ORGANIZED HEALTH CARE EDUCATION/TRAINING PROGRAM
Payer: COMMERCIAL

## 2025-06-30 ENCOUNTER — APPOINTMENT (OUTPATIENT)
Dept: GENERAL RADIOLOGY | Facility: HOSPITAL | Age: 35
End: 2025-06-30
Attending: STUDENT IN AN ORGANIZED HEALTH CARE EDUCATION/TRAINING PROGRAM
Payer: COMMERCIAL

## 2025-06-30 ENCOUNTER — HOSPITAL ENCOUNTER (OUTPATIENT)
Age: 35
Discharge: HOME | End: 2025-06-30
Payer: COMMERCIAL

## 2025-06-30 VITALS
OXYGEN SATURATION: 100 % | HEIGHT: 64 IN | RESPIRATION RATE: 18 BRPM | WEIGHT: 193 LBS | SYSTOLIC BLOOD PRESSURE: 114 MMHG | TEMPERATURE: 98 F | HEART RATE: 94 BPM | BODY MASS INDEX: 32.95 KG/M2 | DIASTOLIC BLOOD PRESSURE: 78 MMHG

## 2025-06-30 VITALS
RESPIRATION RATE: 18 BRPM | DIASTOLIC BLOOD PRESSURE: 91 MMHG | SYSTOLIC BLOOD PRESSURE: 140 MMHG | TEMPERATURE: 98 F | HEART RATE: 106 BPM | OXYGEN SATURATION: 100 %

## 2025-06-30 DIAGNOSIS — J45.901 ASTHMA EXACERBATION, MILD (HCC): Primary | ICD-10-CM

## 2025-06-30 DIAGNOSIS — J45.901 MILD ASTHMA WITH EXACERBATION, UNSPECIFIED WHETHER PERSISTENT (HCC): Primary | ICD-10-CM

## 2025-06-30 PROCEDURE — 93010 ELECTROCARDIOGRAM REPORT: CPT

## 2025-06-30 PROCEDURE — 99213 OFFICE O/P EST LOW 20 MIN: CPT | Performed by: PHYSICIAN ASSISTANT

## 2025-06-30 PROCEDURE — 94640 AIRWAY INHALATION TREATMENT: CPT | Performed by: PHYSICIAN ASSISTANT

## 2025-06-30 PROCEDURE — 71046 X-RAY EXAM CHEST 2 VIEWS: CPT | Performed by: STUDENT IN AN ORGANIZED HEALTH CARE EDUCATION/TRAINING PROGRAM

## 2025-06-30 PROCEDURE — 93005 ELECTROCARDIOGRAM TRACING: CPT

## 2025-06-30 PROCEDURE — 99284 EMERGENCY DEPT VISIT MOD MDM: CPT

## 2025-06-30 PROCEDURE — 99283 EMERGENCY DEPT VISIT LOW MDM: CPT

## 2025-06-30 RX ORDER — IPRATROPIUM BROMIDE AND ALBUTEROL SULFATE 2.5; .5 MG/3ML; MG/3ML
3 SOLUTION RESPIRATORY (INHALATION) ONCE
Status: COMPLETED | OUTPATIENT
Start: 2025-06-30 | End: 2025-06-30

## 2025-06-30 RX ORDER — ALBUTEROL SULFATE 0.83 MG/ML
2.5 SOLUTION RESPIRATORY (INHALATION) EVERY 4 HOURS PRN
Qty: 30 EACH | Refills: 0 | Status: SHIPPED | OUTPATIENT
Start: 2025-06-30 | End: 2025-07-30

## 2025-06-30 RX ORDER — DEXAMETHASONE 4 MG/1
6 TABLET ORAL ONCE
Status: COMPLETED | OUTPATIENT
Start: 2025-06-30 | End: 2025-06-30

## 2025-06-30 NOTE — ED PROVIDER NOTES
Patient Seen in: Immediate Care [facility]        History  No chief complaint on file.    Stated Complaint: Asthma    Subjective:   HPI            34-year-old female presents to immediate care for evaluation of asthma exacerbation starting today. Patient states recent humidity triggered her asthma symptoms.  Albuterol inhaler has not provided significant relief. She reports compliance with daily Advair. She is requesting albuterol solution for neb machine at home.          Objective:     Past Medical History:    Allergic rhinitis    Allergies    Asthma (HCC)    Esophageal reflux    Prediabetes              Past Surgical History:   Procedure Laterality Date    Colonoscopy N/A 2024    Procedure: COLONOSCOPY with polypectomy;  Surgeon: Michelle Chavez MD;  Location: [facility] ENDOSCOPY                      Social History     Socioeconomic History    Marital status: Single   Tobacco Use    Smoking status: Never     Passive exposure: Never    Smokeless tobacco: Never   Vaping Use    Vaping status: Never Used   Substance and Sexual Activity    Alcohol use: Not Currently    Drug use: Never   Other Topics Concern    Caffeine Concern No    Exercise No    Seat Belt No    Special Diet No    Stress Concern No    Weight Concern Yes     Comment: Over weight     Social Drivers of Health      Received from [facility]    Housing Stability              Review of Systems    Positive for stated complaint: Asthma  Other systems are as noted in HPI.  Constitutional and vital signs reviewed.      All other systems reviewed and negative except as noted above.      Physical Exam    ED Triage Vitals [25 1324]   BP (!) 140/91   Pulse 106   Resp 18   Temp 97.7 °F (36.5 °C)   Temp src Oral   SpO2 100 %   O2 Device None (Room air)       Current Vitals:   Vital Signs  BP: (!) 140/91  Pulse: 106  Resp: 18  Temp: 97.7 °F (36.5 °C)  Temp src: Oral    Oxygen Therapy  SpO2: 100 %  O2 Device: None (Room  air)            Physical Exam  Vitals and nursing note reviewed.   Constitutional:       General: She is not in acute distress.     Appearance: Normal appearance. She is not ill-appearing, toxic-appearing or diaphoretic.   Cardiovascular:      Rate and Rhythm: Normal rate.      Heart sounds: Normal heart sounds.   Pulmonary:      Effort: Pulmonary effort is normal. No respiratory distress.      Breath sounds: Decreased air movement (lower lobes) present. No wheezing.   Neurological:      Mental Status: She is alert and oriented to person, place, and time.   Psychiatric:         Behavior: Behavior normal.         ED Course  Labs Reviewed - No data to display       MDM     34-year-old female presenting for mild asthma exacerbation starting today after being outside in humidity.     Afebrile and nontoxic in appearance.  SpO2 100% on room air.  Speaking in complete sentences without difficulty.  Slightly decreased air movement noted to lower lobes.  Subjective improvement after DuoNeb and repeat lung exam improved.    Patient has a neb machine at home. Albuterol soln refill prescribed per request. PCP f/u as needed. Return for worsening symptoms.         Medical Decision Making  Risk  Prescription drug management.        Disposition and Plan     Clinical Impression:  1. Mild asthma with exacerbation, unspecified whether persistent (HCC)         Disposition:  Discharge  6/30/2025  2:04 pm    Follow-up:  [facility]mediate Care [address] [address] [phone]    If symptoms worsen          Medications Prescribed:  Discharge Medication List as of 6/30/2025  2:11 PM        START taking these medications    Details   albuterol (2.5 MG/3ML) 0.083% Inhalation Nebu Soln Take 3 mL (2.5 mg total) by nebulization every 4 (four) hours as needed for Wheezing or Shortness of Breath., Normal, Disp-30 each, R-0                   Supplementary Documentation:

## 2025-06-30 NOTE — ED INITIAL ASSESSMENT (HPI)
Pt to ER ambulatory, states SOB since this AM, seen at  prescribed nebulizer but did not pick it up. Denies worsening SOB, states symptoms not improving since being seen at  earlier today. Added has mild chest tightening

## 2025-07-01 NOTE — ED PROVIDER NOTES
History     Chief Complaint   Patient presents with    Breathing Problem       HPI    34 year old female with history of asthma presents for assessment.  Today patient felt some chest tightness and wheezing, reports this often happens with weather changes/humidity.  She went to the urgent care where she had nebulizer therapy which was helpful.  She then awoke later in the afternoon and felt similar symptoms, she was prescribed a nebulizer solution but did not have a chance to pick it up.  States since getting here she now feels better.  No recent fevers or cough or vomiting.  She is compliant with her Advair.  She does have a rescue inhaler which she has been using.          Past Medical History[1]    Past Surgical History[2]    Social History     Socioeconomic History    Marital status: Single   Tobacco Use    Smoking status: Never     Passive exposure: Never    Smokeless tobacco: Never   Vaping Use    Vaping status: Never Used   Substance and Sexual Activity    Alcohol use: Not Currently    Drug use: Never   Other Topics Concern    Caffeine Concern No    Exercise No    Seat Belt No    Special Diet No    Stress Concern No    Weight Concern Yes     Comment: Over weight     Social Drivers of Health      Received from [facility]    Housing Stability                   Physical Exam     ED Triage Vitals [06/30/25 1814]   BP (!) 130/91   Pulse 99   Resp 18   Temp 97.9 °F (36.6 °C)   Temp src Temporal   SpO2 99 %   O2 Device None (Room air)       Physical Exam  Constitutional:       General: She is not in acute distress.  Eyes:      Extraocular Movements: Extraocular movements intact.   Cardiovascular:      Rate and Rhythm: Normal rate and regular rhythm.      Pulses: Normal pulses.      Heart sounds: Normal heart sounds.   Pulmonary:      Effort: Pulmonary effort is normal. No respiratory distress.      Breath sounds: Normal breath sounds.   Musculoskeletal:      Cervical back: Normal range of  motion.   Neurological:      General: No focal deficit present.      Mental Status: She is alert.              ED Course     Labs Reviewed - No data to display  XR CHEST PA + LAT CHEST ([id number])  Result Date: 6/30/2025  CONCLUSION: Hyperinflation. Electronically Verified and Signed by Attending Radiologist: Harish Kwon MD 6/30/2025 9:16 PM Workstation: [id number]          [facility]     Vitals:    06/30/25 1814 06/30/25 2042 06/30/25 2045   BP: (!) 130/91 122/77    Pulse: 99 96 90   Resp: 18 18    Temp: 97.9 °F (36.6 °C)     TempSrc: Temporal     SpO2: 99% 100% 100%   Weight: 87.5 kg     Height: 162.6 cm (5' 4\")         Lungs are clear on examination, oxygen is normal on room air.  Potential bronchospasm/reactive airway.  Will get x-ray for further assessment of bronchitis/pneumonia.    ED Course as of 06/30/25 2125  ------------------------------------------------------------  Time: 06/30 2035  Comment: EKG interpretation by me: EKG sinus rhythm at a rate of 107, axis nomal, no concerning acute ischemic ST changes  ------------------------------------------------------------  Time: 06/30 2052  Comment: CXR interpretation by me with no concerning acute findings    ------------------------------------------------------------  Time: 06/30 2124  Comment: Discussed findings with patient at bedside.  Will give dose of long-acting steroids to help with mild asthma exacerbation.  Continue home nebulizer therapies and Advair.  Discharged in stable condition, return precautions.         Disposition and Plan     Clinical Impression:  1. Asthma exacerbation, mild (HCC)        Disposition:  Discharge    Follow-up:  Reymundo Best, DO  [address] [address]  [phone]    Follow up        Medications Prescribed:  Current Discharge Medication List                   [1]   Past Medical History:   Allergic rhinitis    Allergies    Asthma (HCC)    Esophageal reflux    Prediabetes   [2]   Past Surgical  History:  Procedure Laterality Date    Colonoscopy N/A 2024    Procedure: COLONOSCOPY with polypectomy;  Surgeon: Michelle Chavez MD;  Location: [facility] ENDOSCOPY    [facility]

## 2025-07-03 LAB
ATRIAL RATE: 107 BPM
P AXIS: 35 DEGREES
P-R INTERVAL: 134 MS
Q-T INTERVAL: 326 MS
QRS DURATION: 86 MS
QTC CALCULATION (BEZET): 435 MS
R AXIS: 43 DEGREES
T AXIS: 15 DEGREES
VENTRICULAR RATE: 107 BPM

## 2025-07-08 ENCOUNTER — HOSPITAL ENCOUNTER (EMERGENCY)
Facility: HOSPITAL | Age: 35
Discharge: HOME | End: 2025-07-09
Attending: EMERGENCY MEDICINE
Payer: COMMERCIAL

## 2025-07-08 ENCOUNTER — APPOINTMENT (OUTPATIENT)
Dept: GENERAL RADIOLOGY | Facility: HOSPITAL | Age: 35
End: 2025-07-08
Payer: COMMERCIAL

## 2025-07-08 DIAGNOSIS — R20.2 PARESTHESIAS: ICD-10-CM

## 2025-07-08 DIAGNOSIS — R07.89 CHEST PAIN, ATYPICAL: Primary | ICD-10-CM

## 2025-07-08 DIAGNOSIS — G45.9 TIA (TRANSIENT ISCHEMIC ATTACK): ICD-10-CM

## 2025-07-08 LAB
ALBUMIN SERPL-MCNC: 5 G/DL (ref 3.2–4.8)
ALBUMIN/GLOB SERPL: 1.4 {RATIO} (ref 1–2)
ALP LIVER SERPL-CCNC: 108 U/L (ref 37–98)
ALT SERPL-CCNC: 23 U/L (ref 10–49)
ANION GAP SERPL CALC-SCNC: 7 MMOL/L (ref 0–18)
AST SERPL-CCNC: 26 U/L (ref ?–34)
BASOPHILS # BLD AUTO: 0.03 X10(3) UL (ref 0–0.2)
BASOPHILS NFR BLD AUTO: 0.4 %
BILIRUB SERPL-MCNC: 0.3 MG/DL (ref 0.3–1.2)
BUN BLD-MCNC: 6 MG/DL (ref 9–23)
CALCIUM BLD-MCNC: 10.3 MG/DL (ref 8.7–10.6)
CHLORIDE SERPL-SCNC: 105 MMOL/L (ref 98–112)
CO2 SERPL-SCNC: 27 MMOL/L (ref 21–32)
CREAT BLD-MCNC: 0.89 MG/DL (ref 0.55–1.02)
EGFRCR SERPLBLD CKD-EPI 2021: 86 ML/MIN/1.73M2 (ref 60–?)
EOSINOPHIL # BLD AUTO: 0.21 X10(3) UL (ref 0–0.7)
EOSINOPHIL NFR BLD AUTO: 3.1 %
ERYTHROCYTE [DISTWIDTH] IN BLOOD BY AUTOMATED COUNT: 13.2 %
GLOBULIN PLAS-MCNC: 3.6 G/DL (ref 2–3.5)
GLUCOSE BLD-MCNC: 81 MG/DL (ref 70–99)
HCT VFR BLD AUTO: 40.2 % (ref 35–48)
HGB BLD-MCNC: 12.9 G/DL (ref 12–16)
IMM GRANULOCYTES # BLD AUTO: 0.02 X10(3) UL (ref 0–1)
IMM GRANULOCYTES NFR BLD: 0.3 %
LYMPHOCYTES # BLD AUTO: 2.64 X10(3) UL (ref 1–4)
LYMPHOCYTES NFR BLD AUTO: 39 %
MCH RBC QN AUTO: 25.4 PG (ref 26–34)
MCHC RBC AUTO-ENTMCNC: 32.1 G/DL (ref 31–37)
MCV RBC AUTO: 79.1 FL (ref 80–100)
MONOCYTES # BLD AUTO: 0.38 X10(3) UL (ref 0.1–1)
MONOCYTES NFR BLD AUTO: 5.6 %
NEUTROPHILS # BLD AUTO: 3.49 X10 (3) UL (ref 1.5–7.7)
NEUTROPHILS # BLD AUTO: 3.49 X10(3) UL (ref 1.5–7.7)
NEUTROPHILS NFR BLD AUTO: 51.6 %
OSMOLALITY SERPL CALC.SUM OF ELEC: 285 MOSM/KG (ref 275–295)
PLATELET # BLD AUTO: 442 10(3)UL (ref 150–450)
POTASSIUM SERPL-SCNC: 3.8 MMOL/L (ref 3.5–5.1)
PROT SERPL-MCNC: 8.6 G/DL (ref 5.7–8.2)
RBC # BLD AUTO: 5.08 X10(6)UL (ref 3.8–5.3)
SODIUM SERPL-SCNC: 139 MMOL/L (ref 136–145)
TROPONIN I SERPL HS-MCNC: <3 NG/L (ref ?–34)
WBC # BLD AUTO: 6.8 X10(3) UL (ref 4–11)

## 2025-07-08 PROCEDURE — 99285 EMERGENCY DEPT VISIT HI MDM: CPT

## 2025-07-08 PROCEDURE — 85379 FIBRIN DEGRADATION QUANT: CPT | Performed by: EMERGENCY MEDICINE

## 2025-07-08 PROCEDURE — 85025 COMPLETE CBC W/AUTO DIFF WBC: CPT | Performed by: EMERGENCY MEDICINE

## 2025-07-08 PROCEDURE — 36415 COLL VENOUS BLD VENIPUNCTURE: CPT

## 2025-07-08 PROCEDURE — 80053 COMPREHEN METABOLIC PANEL: CPT | Performed by: EMERGENCY MEDICINE

## 2025-07-08 PROCEDURE — 80053 COMPREHEN METABOLIC PANEL: CPT

## 2025-07-08 PROCEDURE — 84484 ASSAY OF TROPONIN QUANT: CPT | Performed by: EMERGENCY MEDICINE

## 2025-07-08 PROCEDURE — 71045 X-RAY EXAM CHEST 1 VIEW: CPT

## 2025-07-08 PROCEDURE — 93010 ELECTROCARDIOGRAM REPORT: CPT

## 2025-07-08 PROCEDURE — 84484 ASSAY OF TROPONIN QUANT: CPT

## 2025-07-08 PROCEDURE — 93005 ELECTROCARDIOGRAM TRACING: CPT

## 2025-07-08 PROCEDURE — 85025 COMPLETE CBC W/AUTO DIFF WBC: CPT

## 2025-07-09 ENCOUNTER — APPOINTMENT (OUTPATIENT)
Dept: CT IMAGING | Facility: HOSPITAL | Age: 35
End: 2025-07-09
Attending: EMERGENCY MEDICINE
Payer: COMMERCIAL

## 2025-07-09 VITALS
RESPIRATION RATE: 17 BRPM | TEMPERATURE: 98 F | BODY MASS INDEX: 32.44 KG/M2 | SYSTOLIC BLOOD PRESSURE: 126 MMHG | HEIGHT: 64 IN | DIASTOLIC BLOOD PRESSURE: 89 MMHG | HEART RATE: 89 BPM | OXYGEN SATURATION: 100 % | WEIGHT: 190 LBS

## 2025-07-09 LAB
B-HCG UR QL: NEGATIVE
D DIMER PPP FEU-MCNC: <0.27 UG/ML FEU (ref ?–0.5)

## 2025-07-09 PROCEDURE — 70498 CT ANGIOGRAPHY NECK: CPT | Performed by: EMERGENCY MEDICINE

## 2025-07-09 PROCEDURE — 85379 FIBRIN DEGRADATION QUANT: CPT | Performed by: EMERGENCY MEDICINE

## 2025-07-09 PROCEDURE — 81025 URINE PREGNANCY TEST: CPT

## 2025-07-09 PROCEDURE — 71275 CT ANGIOGRAPHY CHEST: CPT | Performed by: EMERGENCY MEDICINE

## 2025-07-09 PROCEDURE — 70496 CT ANGIOGRAPHY HEAD: CPT | Performed by: EMERGENCY MEDICINE

## 2025-07-09 NOTE — ED QUICK NOTES
Rounding Completed in WR.   VS re-checked.   Pain re-assessment done.    Plan of Care reviewed. Waiting for ED room assignment.  Elimination needs assessed.

## 2025-07-09 NOTE — ED PROVIDER NOTES
Patient Seen in: [facility] Emergency Department        History  Chief Complaint   Patient presents with    Chest Pain Angina     Stated Complaint: CP    Subjective:   HPI            Patient is a 34-year-old female presents to ED for evaluation of chest pain.  Patient states she has been having chest pain for the last 2 weeks.  Patient states it is on the left side of her chest described as a dull pain last for 1 or 2 seconds happens once or twice an hour.  Nonexertional and unrelated to breathing.  There is a family history of heart disease with her grandparents having heart disease.  She states she has prediabetes.  Denies history of smoking, hypertension, high cholesterol.  Patient seen by [facility] as an outpatient and supposed to have some type of cardiac testing next Monday.  Patient denies thromboembolic risk factors such as family history, hormone therapy, smoking, recent travel, recent surgery.  Patient states that his evening prior to arrival she felt like her left arm was numb, heavy and weak for about 7 minutes and resolved.  Denies history of stroke or TIA    Objective:     Past Medical History:    Allergic rhinitis    Allergies    Asthma (HCC)    Esophageal reflux    Prediabetes              Past Surgical History:   Procedure Laterality Date    Colonoscopy N/A 2024    Procedure: COLONOSCOPY with polypectomy;  Surgeon: Michelle Chavez MD;  Location: [facility] ENDOSCOPY                      Social History     Socioeconomic History    Marital status: Single   Tobacco Use    Smoking status: Never     Passive exposure: Never    Smokeless tobacco: Never   Vaping Use    Vaping status: Never Used   Substance and Sexual Activity    Alcohol use: Not Currently    Drug use: Never   Other Topics Concern    Caffeine Concern No    Exercise No    Seat Belt No    Special Diet No    Stress Concern No    Weight Concern Yes     Comment: Over weight     Social Drivers of Health      Received  from [facility]    Housing Stability                                Physical Exam    ED Triage Vitals [07/08/25 2050]   BP (!) 134/99   Pulse 93   Resp 18   Temp 97.3 °F (36.3 °C)   Temp src Temporal   SpO2 97 %   O2 Device None (Room air)       Current Vitals:   Vital Signs  BP: 126/89  Pulse: 89  Resp: 17  Temp: 97.7 °F (36.5 °C)  Temp src: Oral  MAP (mmHg): 100    Oxygen Therapy  SpO2: 100 %  O2 Device: None (Room air)            Physical Exam  GENERAL: No acute distress, well appearing and non-toxic, Alert and oriented X 3   HEENT: Normocephalic, atraumatic.  Moist mucous membranes.  Pupils equal round reactive to light and accommodation, extraocular motion is intact, sclerae white, conjunctiva is pink.  Oropharynx is unremarkable, no exudate.  NECK: Supple, trachea midline, no lymphadenopathy.   LUNG: Lungs clear to auscultation bilaterally, no wheezing, no rales, no rhonchi.  CARDIOVASCULAR: Regular rate and rhythm.  Normal S1S2.  No S3S4 or murmur.  ABDOMEN: Bowel sounds are present. Soft. nondistended, no pulsatile masses. nontender  MUSCULOSKELETAL: No calf tenderness.  Dorsalis and Posterior Tibial pulses present. No clubbing. No cyanosis.  No edema.   SKIN EXAMINATIoN: Warm and dry with normal appearance.  No rashes or lesions.  NEUROLOGICAL:  Motor strength intact all groups.  normal sensation, speech intact.  No pronator drift to upper extremities.  Cranial nerves grossly intact.        ED Course  Labs Reviewed   COMP METABOLIC PANEL (14) - Abnormal; Notable for the following components:       Result Value    BUN 6 (*)     Alkaline Phosphatase 108 (*)     Total Protein 8.6 (*)     Albumin 5.0 (*)     Globulin  3.6 (*)     All other components within normal limits   CBC WITH DIFFERENTIAL WITH PLATELET - Abnormal; Notable for the following components:    MCV 79.1 (*)     MCH 25.4 (*)     All other components within normal limits   TROPONIN I HIGH SENSITIVITY - Normal   D-DIMER - Normal    RAINBOW DRAW LAVENDER   RAINBOW DRAW LIGHT GREEN   RAINBOW DRAW BLUE     EKG    Rate, intervals and axes as noted on EKG Report.  Rate: 102  Rhythm: Sinus Rhythm  Reading: Sinus tachycardia.  No acute change              I personally reviewd CT images of chest and independent interpretation shows no evidence for pulmonary embolism.  I also viewed formal radiology report as read by radiology with findings below:    Non-contrast Head:  No acute intracranial abnormality. No acute territorial infarct, hemorrhage, mass effect, or midline shift.    CTA Head:  The [place] is patent without aneurysm or occlusion. Cerebral venous sinuses are patent.    CTA Neck:  The bilateral vertebral arteries are widely patent. Left vertebral artery is dominant. Bilateral common carotid and internal carotid arteries are widely patent without significant stenosis.    CTA Chest:  No pulmonary embolus. Technically adequate study. No thoracic aneurysm or dissection.    Lungs are clear without consolidation, effusion, or pneumothorax.     Mediastinal and cardiac structures are unremarkable. Visualized portions of the upper abdomen are unremarkable.                    MDM     Patient is a 34-year-old female presents to ED for evaluation of chest pain.  Intermittent chest pain for the last couple weeks.  Differential costochondritis, ACS, PE.  EKG normal.  Normal troponin.  D-dimer normal.  CT chest obtained negative for pulmonary embolism.  Patient with atypical symptoms only lasting for a second.  She is low risk for cardiac disease.  Given negative workup with negative EKG, troponin, CT chest, patient can be discharged.  I spoke with [facility] nurse practitioner and she is supposed to have a coronary CT next Monday.  She can go home and have this followed up as scheduled as an outpatient.  Admission not indicated.  Patient also with episode of left arm heaviness, numbness today lasting for 7 minutes now  resolved on presentation.  She did undergo CT angiogram of the head and neck which was negative for any acute process.  Told patient she may have had a TIA.  Given normal neurologic exam, low risk, patient can be discharged home.  Recommend baby aspirin daily.  Recommend follow-up with neurology in TIA clinic.  Patient advised to return to ED if any further neurologic symptoms such as weakness, numbness, headache, slurred speech or other problems.    Patient was screened and evaluated during this visit.   As a treating physician attending to the patient, I determined, within reasonable clinical confidence and prior to discharge, that an emergency medical condition was not or was no longer present.  There was no indication for further evaluation, treatment or admission on an emergency basis.  Comprehensive verbal and written discharge and follow-up instructions were provided to help prevent relapse or worsening.  Patient was instructed to follow-up with her primary care provider for further evaluation and treatment, but to return immediately to the ER for worsening, concerning, new, changing or persisting symptoms.  I discussed the case with the patient and they had no questions, complaints, or concerns.  Patient felt comfortable going home.            Medical Decision Making      Disposition and Plan     Clinical Impression:  1. Chest pain, atypical    2. Paresthesias    3. TIA (transient ischemic attack)         Disposition:  Discharge  7/9/2025  3:14 am    Follow-up:  Francisco Everett MD  [address] [place] [address] [phone]    Follow up in 2 day(s)      [address] [address] [address] [place] [id number] [phone]  Call  choose option 1 for general neurology          Medications Prescribed:  Discharge Medication List as of 7/9/2025  3:27 AM                Supplementary Documentation:

## 2025-07-09 NOTE — DISCHARGE INSTRUCTIONS
Begin 81 mg aspirin daily.       Recommend you follow-up with neurology for your arm symptoms that you had today.  Call today for an appointment    Recommend cardiac testing as already scheduled

## 2025-07-09 NOTE — ED INITIAL ASSESSMENT (HPI)
Pt c/o intermittent sharp chest pain x 2 weeks. Pt has seen [facility], scheduled for procedure on Monday but noted increase pain to neck and left arm and high HR PTA. Pt reports feeling off.

## 2025-07-10 LAB
ATRIAL RATE: 102 BPM
P AXIS: 40 DEGREES
P-R INTERVAL: 136 MS
Q-T INTERVAL: 352 MS
QRS DURATION: 82 MS
QTC CALCULATION (BEZET): 458 MS
R AXIS: 56 DEGREES
T AXIS: 36 DEGREES
VENTRICULAR RATE: 102 BPM

## 2025-07-25 RX ORDER — TIRZEPATIDE 5 MG/.5ML
5 INJECTION, SOLUTION SUBCUTANEOUS WEEKLY
Qty: 2 ML | Refills: 0 | Status: SHIPPED | OUTPATIENT
Start: 2025-07-25 | End: 2025-08-16

## 2025-07-25 NOTE — TELEPHONE ENCOUNTER
Refill no protocol. Please review patient response to GLP-1 questions.    - What is your current weight?  my weight was at 185 and I’m back up to 192 currently     - Are you weighing yourself at home, if so how often? I weigh myself weekly on Sundays     - Do you have any side effects or concerns about your current 5 mg dose?  I have mild side effects which is burping , nausea the day after injection.    - Do you feel ready to move to the next dose? I’m hoping 7.5 can get me to where I need to be I’m hoping that dose isn’t to strong

## 2025-08-08 DIAGNOSIS — E55.9 VITAMIN D INSUFFICIENCY: ICD-10-CM

## 2025-08-12 RX ORDER — ERGOCALCIFEROL 1.25 MG/1
50000 CAPSULE, LIQUID FILLED ORAL WEEKLY
Qty: 12 CAPSULE | Refills: 0 | Status: SHIPPED | OUTPATIENT
Start: 2025-08-12 | End: 2025-11-04

## 2025-08-20 ENCOUNTER — OFFICE VISIT (OUTPATIENT)
Facility: CLINIC | Age: 35
End: 2025-08-20

## 2025-08-20 VITALS
WEIGHT: 185 LBS | BODY MASS INDEX: 31.58 KG/M2 | HEIGHT: 64 IN | DIASTOLIC BLOOD PRESSURE: 80 MMHG | OXYGEN SATURATION: 100 % | HEART RATE: 96 BPM | RESPIRATION RATE: 16 BRPM | TEMPERATURE: 99 F | SYSTOLIC BLOOD PRESSURE: 120 MMHG

## 2025-08-20 DIAGNOSIS — J45.40 MODERATE PERSISTENT ASTHMA WITHOUT COMPLICATION (HCC): Primary | ICD-10-CM

## 2025-08-20 DIAGNOSIS — J30.81 ALLERGIC RHINITIS DUE TO ANIMAL HAIR AND DANDER: ICD-10-CM

## 2025-08-20 LAB
FEF 25-75%: 1.79 L/S
FET: 10.9 S
FEV1/FVC: 0.78
FEV1: 2.01 L
FVC: 2.59 L
PEF: 277 L/MIN

## 2025-08-20 PROCEDURE — 99204 OFFICE O/P NEW MOD 45 MIN: CPT | Performed by: INTERNAL MEDICINE

## 2025-08-20 PROCEDURE — 94010 BREATHING CAPACITY TEST: CPT | Performed by: INTERNAL MEDICINE

## 2025-08-20 RX ORDER — FLUTICASONE FUROATE AND VILANTEROL 200; 25 UG/1; UG/1
1 POWDER RESPIRATORY (INHALATION) DAILY
Qty: 1 EACH | Refills: 2 | Status: SHIPPED | OUTPATIENT
Start: 2025-08-20

## 2025-08-20 RX ORDER — ONDANSETRON 4 MG/1
4 TABLET, FILM COATED ORAL EVERY 8 HOURS PRN
COMMUNITY
Start: 2025-04-03

## 2025-08-20 RX ORDER — TIRZEPATIDE 5 MG/.5ML
INJECTION, SOLUTION SUBCUTANEOUS
COMMUNITY
Start: 2025-06-27

## 2025-08-20 RX ORDER — MONTELUKAST SODIUM 10 MG/1
10 TABLET ORAL NIGHTLY
Qty: 30 TABLET | Refills: 3 | Status: SHIPPED | OUTPATIENT
Start: 2025-08-20

## 2025-08-29 ENCOUNTER — LAB ENCOUNTER (OUTPATIENT)
Dept: LAB | Age: 35
End: 2025-08-29
Attending: FAMILY MEDICINE

## 2025-08-29 ENCOUNTER — LAB ENCOUNTER (OUTPATIENT)
Dept: LAB | Age: 35
End: 2025-08-29
Attending: INTERNAL MEDICINE

## 2025-08-29 DIAGNOSIS — E78.5 HYPERLIPIDEMIA, UNSPECIFIED HYPERLIPIDEMIA TYPE: ICD-10-CM

## 2025-08-29 DIAGNOSIS — J45.40 MODERATE PERSISTENT ASTHMA WITHOUT COMPLICATION (HCC): ICD-10-CM

## 2025-08-29 LAB
CHOLEST SERPL-MCNC: 200 MG/DL (ref ?–200)
FASTING PATIENT LIPID ANSWER: YES
HDLC SERPL-MCNC: 53 MG/DL (ref 40–59)
LDLC SERPL CALC-MCNC: 137 MG/DL (ref ?–100)
NONHDLC SERPL-MCNC: 147 MG/DL (ref ?–130)
TRIGL SERPL-MCNC: 57 MG/DL (ref 30–149)
VLDLC SERPL CALC-MCNC: 10 MG/DL (ref 0–30)

## 2025-08-29 PROCEDURE — 82785 ASSAY OF IGE: CPT

## 2025-08-29 PROCEDURE — 36415 COLL VENOUS BLD VENIPUNCTURE: CPT

## 2025-08-29 PROCEDURE — 82103 ALPHA-1-ANTITRYPSIN TOTAL: CPT

## 2025-08-29 PROCEDURE — 86003 ALLG SPEC IGE CRUDE XTRC EA: CPT

## 2025-08-29 PROCEDURE — 82104 ALPHA-1-ANTITRYPSIN PHENO: CPT

## 2025-08-29 PROCEDURE — 80061 LIPID PANEL: CPT

## 2025-08-29 RX ORDER — ONDANSETRON 4 MG/1
4 TABLET, ORALLY DISINTEGRATING ORAL EVERY 8 HOURS PRN
Qty: 20 TABLET | Refills: 0 | Status: SHIPPED | OUTPATIENT
Start: 2025-08-29

## 2025-08-29 RX ORDER — TIRZEPATIDE 7.5 MG/.5ML
7.5 INJECTION, SOLUTION SUBCUTANEOUS WEEKLY
Qty: 2 ML | Refills: 0 | Status: SHIPPED | OUTPATIENT
Start: 2025-08-29 | End: 2025-09-20

## (undated) DEVICE — KIT CLEAN ENDOKIT 1.1OZ GOWNX2

## (undated) DEVICE — 60 ML SYRINGE REGULAR TIP: Brand: MONOJECT

## (undated) DEVICE — MEDI-VAC NON-CONDUCTIVE SUCTION TUBING: Brand: CARDINAL HEALTH

## (undated) DEVICE — YANKAUER,BULB TIP,W/O VENT,RIGID,STERILE: Brand: MEDLINE

## (undated) DEVICE — MEDI-VAC NON-CONDUCTIVE SUCTION TUBING 6MM X 1.8M (6FT.) L: Brand: CARDINAL HEALTH

## (undated) DEVICE — KIT ENDO ORCAPOD 160/180/190

## (undated) DEVICE — CONMED SCOPE SAVER BITE BLOCK, 20X27 MM: Brand: SCOPE SAVER

## (undated) DEVICE — Device: Brand: DUAL NARE NASAL CANNULAE FEMALE LUER CON 7FT O2 TUBE

## (undated) DEVICE — GIJAW SINGLE-USE BIOPSY FORCEPS WITH NEEDLE: Brand: GIJAW

## (undated) NOTE — LETTER
July 9, 2025    Patient: Haider Sanders   Date of Visit: 7/8/2025       To Whom It May Concern:    Haider Sanders was seen and treated in our emergency department on 7/8/2025. She should not return to work until 7/10/25.    If you have any questions or concerns, please don't hesitate to call.       Encounter Provider(s):    Herve Acuña MD

## (undated) NOTE — LETTER
Date & Time: 2/19/2025, 5:11 PM  Patient: Haider Sanders  Encounter Provider(s):    Deepika De La Fuente PA-C       To Whom It May Concern:    Haider Sanders was seen and treated in our department on 2/19/2025. She can return to work 2/21/2025.    If you have any questions or concerns, please do not hesitate to call.        _____________________________  Physician/APC Signature

## (undated) NOTE — Clinical Note
Please let patient know we received notice from [facility], Advair (fluticasone salmeterol) inhaler denied.    We will trial Dulera /5 - 2 puffs 2x daily

## (undated) NOTE — LETTER
ASTHMA ACTION PLAN for Haider Sanders     : 10/21/1990     Date: 25  Doctor:  Reymundo Best DO  Phone for doctor or clinic: [facility], [address] [address] [phone]           ACT Goal: 20 or greater    Call your provider if you require your rescue/quick reliever medication more than 2-3 times in a 24 hour period.    If you require your rescue inhaler/medication more than 2-3 times weekly, your asthma may not be under proper control and you should seek medical attention.    *Quick Relievers are Xopenex and Albuterol*    You can use the colors of a traffic light to help learn about your asthma medicines.  {Therapy/Year Round/Winter Mgmt/Seasonal:5667}       1. Green - Go! % of Personal Best Peak Flow   Use controller medicine.   Breathing is good  No cough or wheeze  Can work and play Medicine How much to take When to take it    Medications       Sympathomimetics Instructions     fluticasone-salmeterol 250-50 MCG/ACT Inhalation Aerosol Powder, Breath Activated Inhale 1 puff into the lungs 2 (two) times daily.     albuterol (VENTOLIN HFA) 108 (90 Base) MCG/ACT Inhalation Aero Soln Inhale 1 puff into the lungs every 4 (four) hours as needed for Wheezing or Shortness of Breath.                    2. Yellow - Caution. 50-79% Personal Best Peak Flow  Use reliever medicine to keep an asthma attack from getting bad.   Cough  Quick Relievers  Wheezing  Tight Chest  Wake up at night Medicine How much to take When to take it    If symptoms are not improving in 24-48 hrs, call office for further instructions  Medications       Sympathomimetics Instructions     fluticasone-salmeterol 250-50 MCG/ACT Inhalation Aerosol Powder, Breath Activated Inhale 1 puff into the lungs 2 (two) times daily.     albuterol (VENTOLIN HFA) 108 (90 Base) MCG/ACT Inhalation Aero Soln Inhale 1 puff into the lungs every 4 (four) hours as needed for Wheezing or  Shortness of Breath.                    3. Red - Stop! Danger! <50% Personal Best Peak Flow  Continue Controller Medications But ADD:   Medicine not helping  Breathing is hard and fast  Nose opens wide  Can't walk  Ribs show  Can't talk well Medicine How much to take When to take it    If your symptoms do not improve in ONE hour -  go to the emergency room or call 911 immediately! If symptoms improve, call office for appointment immediately.    {Choose Albuterol/Xopenex INHALER or NEBULIZER every 20 min:6750}       Don't forget:  Rinse mouth after using inhaler  Use spacer for inhaler  Remember to get your Flu vaccine every fall!    [x] Asthma Action Plan reviewed with the caregiver and patient, and a copy of the plan was given to the patient/caregiver.   [] Asthma Action Plan reviewed with the caregiver and patient on the phone, and copy mailed to patient/caregiver or sent via [employer].     Signatures:   Provider  Reymundo Best, DO Patient  Marlenedoreen Serratoy Caretaker

## (undated) NOTE — LETTER
ASTHMA ACTION PLAN for Haider Sandesr     : 10/21/1990     Date: 25  Doctor:  Reymundo Best DO  Phone for doctor or clinic: [facility], [address] [address] [phone]      ACT Score: 22    ACT Goal: 20 or greater    Call your provider if you require your rescue/quick reliever medication more than 2-3 times in a 24 hour period.    If you require your rescue inhaler/medication more than 2-3 times weekly, your asthma may not be under proper control and you should seek medical attention.    *Quick Relievers are Xopenex and Albuterol*    You can use the colors of a traffic light to help learn about your asthma medicines.  Year Round       1. Green - Go! % of Personal Best Peak Flow   Use controller medicine.   Breathing is good  No cough or wheeze  Can work and play Medicine How much to take When to take it    Medications       Sympathomimetics Instructions     fluticasone-salmeterol 250-50 MCG/ACT Inhalation Aerosol Powder, Breath Activated Inhale 1 puff into the lungs 2 (two) times daily.     albuterol (VENTOLIN HFA) 108 (90 Base) MCG/ACT Inhalation Aero Soln Inhale 1 puff into the lungs every 4 (four) hours as needed for Wheezing or Shortness of Breath.                    2. Yellow - Caution. 50-79% Personal Best Peak Flow  Use reliever medicine to keep an asthma attack from getting bad.   Cough  Quick Relievers  Wheezing  Tight Chest  Wake up at night Medicine How much to take When to take it    If symptoms are not improving in 24-48 hrs, call office for further instructions  Medications       Sympathomimetics Instructions     fluticasone-salmeterol 250-50 MCG/ACT Inhalation Aerosol Powder, Breath Activated Inhale 1 puff into the lungs 2 (two) times daily.     albuterol (VENTOLIN HFA) 108 (90 Base) MCG/ACT Inhalation Aero Soln Inhale 1 puff into the lungs every 4 (four) hours as needed for Wheezing or Shortness of Breath.                     3. Red - Stop! Danger! <50% Personal Best Peak Flow  Continue Controller Medications But ADD:   Medicine not helping  Breathing is hard and fast  Nose opens wide  Can't walk  Ribs show  Can't talk well Medicine How much to take When to take it    If your symptoms do not improve in ONE hour -  go to the emergency room or call 911 immediately! If symptoms improve, call office for appointment immediately.    Albuterol inhaler 2 puffs every 20 minutes for three treatments       Don't forget:  Rinse mouth after using inhaler  Use spacer for inhaler  Remember to get your Flu vaccine every fall!    [x] Asthma Action Plan reviewed with the caregiver and patient, and a copy of the plan was given to the patient/caregiver.   [] Asthma Action Plan reviewed with the caregiver and patient on the phone, and copy mailed to patient/caregiver or sent via [employer].     Signatures:   Provider  Reymundo Best, DO Patient  Haider Serratoy Caretaker

## (undated) NOTE — LETTER
[facility]  Administration of Anesthesia  IHaider agree to be cared for by a physician anesthesiologist alone and/or with a nurse anesthetist, who is specially trained to monitor me and give me medicine to put me to sleep or keep me comfortable during my procedure    I understand that my anesthesiologist and/or anesthetist is not an employee or agent of [facility] or [employer]. He or she works for [facility]    As the patient asking for anesthesia services, I agree to:  Allow the anesthesiologist (anesthesia doctor) to give me medicine and do additional procedures as necessary. Some examples are: Starting or using an “IV” to give me medicine, fluids or blood during my procedure, and having a breathing tube placed to help me breathe when I’m asleep (intubation). In the event that my heart stops working properly, I understand that my anesthesiologist will make every effort to sustain my life, unless otherwise directed by [facility] Do Not Resuscitate documents.  Tell my anesthesia doctor before my procedure:  If I am pregnant.  The last time that I ate or drank.  iii. All of the medicines I take (including prescriptions, herbal supplements, and pills I can buy without a prescription (including street drugs/illegal medications). Failure to inform my anesthesiologist about these medicines may increase my risk of anesthetic complications.  iv.If I am allergic to anything or have had a reaction to anesthesia before.  I understand how the anesthesia medicine will help me (benefits).  I understand that with any type of anesthesia medicine there are risks:  The most common risks are: nausea, vomiting, sore throat, muscle soreness, damage to my eyes, mouth, or teeth (from breathing tube placement).  Rare risks include: remembering what happened during my procedure, allergic reactions to medications, injury to my airway, heart, lungs, vision, nerves, or  muscles and in extremely rare instances death.  My doctor has explained to me other choices available to me for my care (alternatives).  Pregnant Patients (“epidural”):  I understand that the risks of having an epidural (medicine given into my back to help control pain during labor), include itching, low blood pressure, difficulty urinating, headache or slowing of the baby’s heart. Very rare risks include infection, bleeding, seizure, irregular heart rhythms and nerve injury.  Regional Anesthesia (“spinal”, “epidural”, & “nerve blocks”):  I understand that rare but potential complications include headache, bleeding, infection, seizure, irregular heart rhythms, and nerve injury.    _____________________________________________________________________________  Patient (or Representative) Signature/Relationship to Patient  Date   Time    _____________________________________________________________________________   Name (if used)    Language/Organization   Time    _____________________________________________________________________________  Nurse Anesthetist Signature     Date   Time  _____________________________________________________________________________  Anesthesiologist Signature     Date   Time  I have discussed the procedure and information above with the patient (or patient’s representative) and answered their questions. The patient or their representative has agreed to have anesthesia services.    _____________________________________________________________________________  Witness        Date   Time  I have verified that the signature is that of the patient or patient’s representative, and that it was signed before the procedure  Patient Name: Haider Sanders     : 10/21/1990                 Printed: 2024 at 8:01 AM    Medical Record #: [id number]                                            Page 1 of 1  ----------ANESTHESIA CONSENT----------